# Patient Record
Sex: FEMALE | Race: WHITE | NOT HISPANIC OR LATINO | Employment: UNEMPLOYED | URBAN - METROPOLITAN AREA
[De-identification: names, ages, dates, MRNs, and addresses within clinical notes are randomized per-mention and may not be internally consistent; named-entity substitution may affect disease eponyms.]

---

## 2017-02-07 ENCOUNTER — RECORDS - HEALTHEAST (OUTPATIENT)
Dept: LAB | Facility: CLINIC | Age: 27
End: 2017-02-07

## 2017-02-07 LAB — HIV 1+2 AB+HIV1 P24 AG SERPL QL IA: NEGATIVE

## 2017-02-08 LAB
HCV AB SERPL QL IA: NEGATIVE
SYPHILIS RPR SCREEN - HISTORICAL: NORMAL

## 2017-06-26 ENCOUNTER — RECORDS - HEALTHEAST (OUTPATIENT)
Dept: LAB | Facility: CLINIC | Age: 27
End: 2017-06-26

## 2017-06-26 LAB — HIV 1+2 AB+HIV1 P24 AG SERPL QL IA: NEGATIVE

## 2017-06-27 LAB
HCV AB SERPL QL IA: NEGATIVE
SYPHILIS RPR SCREEN - HISTORICAL: NORMAL

## 2017-10-17 ENCOUNTER — RECORDS - HEALTHEAST (OUTPATIENT)
Dept: LAB | Facility: CLINIC | Age: 27
End: 2017-10-17

## 2017-10-17 LAB
HCV AB SERPL QL IA: NEGATIVE
HIV 1+2 AB+HIV1 P24 AG SERPL QL IA: NEGATIVE

## 2017-10-18 LAB
HEPATITIS B SURFACE ANTIBODY LHE- HISTORICAL: ABNORMAL
HSV1 IGG SERPL QL IA: POSITIVE
HSV2 IGG SERPL QL IA: NEGATIVE
SYPHILIS RPR SCREEN - HISTORICAL: NORMAL

## 2017-10-20 ENCOUNTER — RECORDS - HEALTHEAST (OUTPATIENT)
Dept: LAB | Facility: CLINIC | Age: 27
End: 2017-10-20

## 2017-10-21 LAB — HEPATITIS B SURFACE ANTIBODY LHE- HISTORICAL: ABNORMAL

## 2017-10-30 ENCOUNTER — RECORDS - HEALTHEAST (OUTPATIENT)
Dept: LAB | Facility: CLINIC | Age: 27
End: 2017-10-30

## 2017-11-01 LAB
HBV CORE AB SERPL QL IA: NEGATIVE
HBV SURFACE AG SERPL QL IA: NEGATIVE
HEPATITIS B SURFACE ANTIBODY LHE- HISTORICAL: ABNORMAL

## 2018-01-04 ENCOUNTER — RECORDS - HEALTHEAST (OUTPATIENT)
Dept: LAB | Facility: CLINIC | Age: 28
End: 2018-01-04

## 2018-01-04 LAB — HIV 1+2 AB+HIV1 P24 AG SERPL QL IA: NEGATIVE

## 2018-01-05 LAB
C TRACH DNA SPEC QL PROBE+SIG AMP: NEGATIVE
HCV AB SERPL QL IA: NEGATIVE
N GONORRHOEA DNA SPEC QL NAA+PROBE: NEGATIVE
SYPHILIS RPR SCREEN - HISTORICAL: NORMAL

## 2018-01-06 LAB — BACTERIA SPEC CULT: ABNORMAL

## 2018-02-27 ENCOUNTER — RECORDS - HEALTHEAST (OUTPATIENT)
Dept: LAB | Facility: CLINIC | Age: 28
End: 2018-02-27

## 2018-02-28 LAB
C TRACH DNA SPEC QL PROBE+SIG AMP: NEGATIVE
N GONORRHOEA DNA SPEC QL NAA+PROBE: NEGATIVE

## 2018-03-16 ENCOUNTER — RECORDS - HEALTHEAST (OUTPATIENT)
Dept: LAB | Facility: CLINIC | Age: 28
End: 2018-03-16

## 2018-03-16 ENCOUNTER — APPOINTMENT (OUTPATIENT)
Dept: ULTRASOUND IMAGING | Facility: CLINIC | Age: 28
End: 2018-03-16
Attending: PHYSICIAN ASSISTANT
Payer: COMMERCIAL

## 2018-03-16 ENCOUNTER — HOSPITAL ENCOUNTER (EMERGENCY)
Facility: CLINIC | Age: 28
Discharge: HOME | End: 2018-03-17
Attending: PHYSICIAN ASSISTANT | Admitting: PHYSICIAN ASSISTANT
Payer: COMMERCIAL

## 2018-03-16 DIAGNOSIS — Z20.2 SEXUALLY TRANSMITTED DISEASE EXPOSURE: ICD-10-CM

## 2018-03-16 DIAGNOSIS — B96.89 BACTERIAL VAGINOSIS: ICD-10-CM

## 2018-03-16 DIAGNOSIS — R10.2 PELVIC PAIN IN FEMALE: ICD-10-CM

## 2018-03-16 DIAGNOSIS — N76.0 BACTERIAL VAGINOSIS: ICD-10-CM

## 2018-03-16 LAB
ALBUMIN SERPL-MCNC: 3.8 G/DL (ref 3.4–5)
ALBUMIN UR-MCNC: NEGATIVE MG/DL
ALP SERPL-CCNC: 70 U/L (ref 40–150)
ALT SERPL W P-5'-P-CCNC: 44 U/L (ref 0–50)
AMORPH CRY #/AREA URNS HPF: ABNORMAL /HPF
ANION GAP SERPL CALCULATED.3IONS-SCNC: 7 MMOL/L (ref 3–14)
APPEARANCE UR: ABNORMAL
AST SERPL W P-5'-P-CCNC: 16 U/L (ref 0–45)
BASOPHILS # BLD AUTO: 0 10E9/L (ref 0–0.2)
BASOPHILS NFR BLD AUTO: 0.2 %
BILIRUB SERPL-MCNC: 0.6 MG/DL (ref 0.2–1.3)
BILIRUB UR QL STRIP: NEGATIVE
BUN SERPL-MCNC: 15 MG/DL (ref 7–30)
CALCIUM SERPL-MCNC: 8.8 MG/DL (ref 8.5–10.1)
CHLORIDE SERPL-SCNC: 106 MMOL/L (ref 94–109)
CO2 SERPL-SCNC: 25 MMOL/L (ref 20–32)
COLOR UR AUTO: YELLOW
CREAT SERPL-MCNC: 0.91 MG/DL (ref 0.52–1.04)
DIFFERENTIAL METHOD BLD: ABNORMAL
EOSINOPHIL # BLD AUTO: 0.3 10E9/L (ref 0–0.7)
EOSINOPHIL NFR BLD AUTO: 2.6 %
ERYTHROCYTE [DISTWIDTH] IN BLOOD BY AUTOMATED COUNT: 13 % (ref 10–15)
GFR SERPL CREATININE-BSD FRML MDRD: 74 ML/MIN/1.7M2
GLUCOSE SERPL-MCNC: 91 MG/DL (ref 70–99)
GLUCOSE UR STRIP-MCNC: NEGATIVE MG/DL
HCG UR QL: NEGATIVE
HCT VFR BLD AUTO: 39.7 % (ref 35–47)
HGB BLD-MCNC: 13.6 G/DL (ref 11.7–15.7)
HGB UR QL STRIP: NEGATIVE
IMM GRANULOCYTES # BLD: 0.1 10E9/L (ref 0–0.4)
IMM GRANULOCYTES NFR BLD: 0.5 %
KETONES UR STRIP-MCNC: NEGATIVE MG/DL
LEUKOCYTE ESTERASE UR QL STRIP: ABNORMAL
LYMPHOCYTES # BLD AUTO: 3.3 10E9/L (ref 0.8–5.3)
LYMPHOCYTES NFR BLD AUTO: 25.9 %
MCH RBC QN AUTO: 29 PG (ref 26.5–33)
MCHC RBC AUTO-ENTMCNC: 34.3 G/DL (ref 31.5–36.5)
MCV RBC AUTO: 85 FL (ref 78–100)
MONOCYTES # BLD AUTO: 0.8 10E9/L (ref 0–1.3)
MONOCYTES NFR BLD AUTO: 6.2 %
MUCOUS THREADS #/AREA URNS LPF: PRESENT /LPF
NEUTROPHILS # BLD AUTO: 8.2 10E9/L (ref 1.6–8.3)
NEUTROPHILS NFR BLD AUTO: 64.6 %
NITRATE UR QL: NEGATIVE
NRBC # BLD AUTO: 0 10*3/UL
NRBC BLD AUTO-RTO: 0 /100
PH UR STRIP: 7 PH (ref 5–7)
PLATELET # BLD AUTO: 210 10E9/L (ref 150–450)
POTASSIUM SERPL-SCNC: 4 MMOL/L (ref 3.4–5.3)
PROT SERPL-MCNC: 6.7 G/DL (ref 6.8–8.8)
RBC # BLD AUTO: 4.69 10E12/L (ref 3.8–5.2)
RBC #/AREA URNS AUTO: 6 /HPF (ref 0–2)
SODIUM SERPL-SCNC: 138 MMOL/L (ref 133–144)
SOURCE: ABNORMAL
SP GR UR STRIP: 1.01 (ref 1–1.03)
SPECIMEN SOURCE: ABNORMAL
SQUAMOUS #/AREA URNS AUTO: 3 /HPF (ref 0–1)
UROBILINOGEN UR STRIP-MCNC: NORMAL MG/DL (ref 0–2)
WBC # BLD AUTO: 12.7 10E9/L (ref 4–11)
WBC #/AREA URNS AUTO: 19 /HPF (ref 0–5)
WET PREP SPEC: ABNORMAL

## 2018-03-16 PROCEDURE — 93976 VASCULAR STUDY: CPT | Mod: XS

## 2018-03-16 PROCEDURE — 87591 N.GONORRHOEAE DNA AMP PROB: CPT | Performed by: PHYSICIAN ASSISTANT

## 2018-03-16 PROCEDURE — 99285 EMERGENCY DEPT VISIT HI MDM: CPT | Mod: 25

## 2018-03-16 PROCEDURE — 87491 CHLMYD TRACH DNA AMP PROBE: CPT | Performed by: PHYSICIAN ASSISTANT

## 2018-03-16 PROCEDURE — 25000128 H RX IP 250 OP 636: Performed by: PHYSICIAN ASSISTANT

## 2018-03-16 PROCEDURE — 87210 SMEAR WET MOUNT SALINE/INK: CPT | Performed by: PHYSICIAN ASSISTANT

## 2018-03-16 PROCEDURE — 81025 URINE PREGNANCY TEST: CPT | Performed by: EMERGENCY MEDICINE

## 2018-03-16 PROCEDURE — 80053 COMPREHEN METABOLIC PANEL: CPT | Performed by: PHYSICIAN ASSISTANT

## 2018-03-16 PROCEDURE — 85025 COMPLETE CBC W/AUTO DIFF WBC: CPT | Performed by: PHYSICIAN ASSISTANT

## 2018-03-16 PROCEDURE — 81001 URINALYSIS AUTO W/SCOPE: CPT | Performed by: EMERGENCY MEDICINE

## 2018-03-16 PROCEDURE — 96372 THER/PROPH/DIAG INJ SC/IM: CPT

## 2018-03-16 PROCEDURE — 25000132 ZZH RX MED GY IP 250 OP 250 PS 637: Performed by: PHYSICIAN ASSISTANT

## 2018-03-16 RX ORDER — AZITHROMYCIN 250 MG/1
1000 TABLET, FILM COATED ORAL ONCE
Status: COMPLETED | OUTPATIENT
Start: 2018-03-16 | End: 2018-03-16

## 2018-03-16 RX ORDER — CEFTRIAXONE SODIUM 250 MG
250 VIAL (EA) INJECTION EVERY 24 HOURS
Status: DISCONTINUED | OUTPATIENT
Start: 2018-03-16 | End: 2018-03-17 | Stop reason: HOSPADM

## 2018-03-16 RX ORDER — ARIPIPRAZOLE ORAL 1 MG/ML
5 SOLUTION ORAL DAILY
Status: ON HOLD | COMMUNITY
End: 2022-08-12

## 2018-03-16 RX ADMIN — AZITHROMYCIN 1000 MG: 250 TABLET, FILM COATED ORAL at 21:53

## 2018-03-16 RX ADMIN — CEFTRIAXONE SODIUM 250 MG: 250 INJECTION, POWDER, FOR SOLUTION INTRAMUSCULAR; INTRAVENOUS at 21:54

## 2018-03-16 ASSESSMENT — ENCOUNTER SYMPTOMS
NAUSEA: 0
VOMITING: 0
DIARRHEA: 0
DYSURIA: 1

## 2018-03-16 NOTE — ED AVS SNAPSHOT
Emergency Department    [address] [facility] [address]    Phone:  [phone]    Fax:  [phone]                                       Eden Castro   MRN: [id number]    Department:   Emergency Department   Date of Visit:  3/16/2018           After Visit Summary Signature Page     I have received my discharge instructions, and my questions have been answered. I have discussed any challenges I see with this plan with the nurse or doctor.    ..........................................................................................................................................  Patient/Patient Representative Signature      ..........................................................................................................................................  Patient Representative Print Name and Relationship to Patient    ..................................................               ................................................  Date                                            Time    ..........................................................................................................................................  Reviewed by Signature/Title    ...................................................              ..............................................  Date                                                            Time

## 2018-03-16 NOTE — ED AVS SNAPSHOT
Emergency Department    [address] [facility] [address]    Phone:  [phone]    Fax:  [phone]                                       Eden Castro   MRN: [id number]    Department:   Emergency Department   Date of Visit:  3/16/2018           Patient Information     Date Of Birth          1990        Your diagnoses for this visit were:     Sexually transmitted disease exposure     Pelvic pain in female     Bacterial vaginosis        You were seen by Jordana Correa PA-C.      Follow-up Information     Follow up with OBGYN In 3 days.    Why:  recheck        Follow up with  Emergency Department.    Specialty:  EMERGENCY MEDICINE    Why:  If symptoms worsen    Contact information:    [address] [facility] [address] [phone]        Follow up with OBGYN In 2 weeks.    Why:  test of cure        Discharge Instructions         It is important that you abstain from sex until your retested.  Please take the antibiotics as prescribed (take the additional metronidazole).  Please use condoms in the future sexual encounters to help prevent STI transmission.  I encourage you have your boyfriend be retested as well.    Pelvic Inflammatory Disease    Pelvic inflammatory disease (PID) is an infection of the female reproductive organs. These include the vagina, cervix, uterus, fallopian tubes, and ovaries.  PID is a common problem among women. It is most often caused by gonorrhea or chlamydia. These are bacterial infections that are spread through sex. For this reason, they are known as sexually transmitted diseases (STDs). PID can also be caused by other infections, though this is much less common.  When PID is found and treated early, it can often be cured. If not treated promptly, PID can lead to serious health problems. These include chronic pelvic pain and damage to the reproductive organs. PID can also lead to infertility. And it can increase the risk of tubal  pregnancy. Mild cases of PID can often be treated at home. Severe cases of PID may need to be treated in the hospital.  Home care    You will likely be prescribed a combination of antibiotics. Be sure to take the medicines exactly as directed.    To help relieve pain, you may take over-the-counter pain medicine. Pain medicine may also be prescribed, if needed.    Inform any partners you ve had sex with about your condition. They will need to be tested for infection and treated as well.    Avoid having sex until you and any partners have finished treatment and tests show that you are no longer infected.  Prevention    If you choose to have sex, make sure to practice safer sex. For instance, have sex with only one partner who only has sex with you. Ask your partner to be tested for STDs. Each time you have sex, use latex condoms. These help reduce the risk of STDs.    Avoid douching if advised to by your healthcare provider. Douching may increase the risk of PID.  Follow-up care  Follow up with your healthcare provider, or as advised.  When to seek medical advice  Call your healthcare provider right away if any of these occur:    Fever of 100.4 F (38 C) or higher, or as directed by your healthcare provider    Symptoms do not improve after 3 days of treatment    New or increasing pain in your lower belly or back    Abnormal vaginal bleeding    Abnormal vaginal discharge    Weakness, dizziness or fainting    Nausea or vomiting    Trouble with urination or pain and burning during urination    Rash or joint pain    Painful open sores around the vagina    Swollen or painful lymph nodes in the groin (these may be felt as lumps in the groin)  Resources  To learn more about PID and STDs, contact:    The CDC National STD Hotline, 193.110.5042, www.cdc.gov/std/  Date Last Reviewed: 6/11/2015 2000-2017 [employer]. [address] [place] [address], [place] [address]. All rights reserved. This information is not intended  as a substitute for professional medical care. Always follow your healthcare professional's instructions.        Bacterial Vaginosis    You have a vaginal infection called bacterial vaginosis (BV). Both good and bad bacteria are present in a healthy vagina. BV occurs when these bacteria get out of balance. The number of bad bacteria increase. And the number of good bacteria decrease.  BV may or may not cause symptoms. If symptoms do occur, they can include:    Thin, gray, milky-white, or sometimes green discharge    Unpleasant odor or  fishy  smell    Itching, burning, or pain in or around the vagina  It is not known what causes BV, but certain factors can make the problem more likely. This can include:    Douching    Having sex with a new partner    Having sex with more than one partner  BV will sometimes go away on its own. But treatment is usually recommended. This is because untreated BV can increase the risk of more serious health problems such as:    Pelvic inflammatory disease (PID)     delivery (giving birth to a baby early if you re pregnant)    HIV and certain other sexually transmitted diseases (STDs)    Infection after surgery on the reproductive organs  Home care  General care    BV is most often treated with medicines called antibiotics. These may be given as pills or as a vaginal cream. If antibiotics are prescribed, be sure to use them exactly as directed. Also, be sure to complete all of the medicine, even if your symptoms go away.    Avoid douching or having sex during treatment.    If you have sex with a female partner, ask your healthcare provider if she should also be treated.  Prevention    Limit or avoid douching.    Avoid having sex. If you do have sex, then take steps to lower your risk:    Use condoms when having sex.    Limit the number of partners you have sex with.  Follow-up care  Follow up with your healthcare provider, or as advised.  When to seek medical advice  Call your  healthcare provider right away if:    You have a fever of 100.4 F (38 C) or higher, or as directed by your provider.    Your symptoms worsen, or they don t go away within a few days of starting treatment.    You have new pain in the lower belly or pelvic region.    You have side effects that bother you or a reaction to the pills or cream you re prescribed.    You or any partners you have sex with have new symptoms, such as a rash, joint pain, or sores.  Date Last Reviewed: 7/30/2015 2000-2017 [employer]. [address] [place] [address]. All rights reserved. This information is not intended as a substitute for professional medical care. Always follow your healthcare professional's instructions.          24 Hour Appointment Hotline       To make an appointment at any [facility], call [phone] ([phone]). If you don't have a family doctor or clinic, we will help you find one. [place] clinics are conveniently located to serve the needs of you and your family.             Review of your medicines      START taking        Dose / Directions Last dose taken    doxycycline Monohydrate 100 MG Tabs   Dose:  100 mg   Quantity:  28 tablet        Take 100 mg by mouth 2 times daily for 14 days   Refills:  0          CONTINUE these medicines which may have CHANGED, or have new prescriptions. If we are uncertain of the size of tablets/capsules you have at home, strength may be listed as something that might have changed.        Dose / Directions Last dose taken    metroNIDAZOLE 500 MG tablet   Commonly known as:  FLAGYL   Dose:  500 mg   What changed:    - medication strength  - how much to take  - when to take this   Quantity:  14 tablet        Take 1 tablet (500 mg) by mouth 2 times daily for 7 days   Refills:  1          Our records show that you are taking the medicines listed below. If these are incorrect, please call your family doctor or clinic.        Dose / Directions Last dose  taken    ARIPiprazole 1 MG/ML Soln solution   Commonly known as:  ABILIFY   Dose:  5 mg        Take 5 mg by mouth daily   Refills:  0        GABAPENTIN PO        Refills:  0        PROPRANOLOL HCL PO        Refills:  0        TOPAMAX PO        Refills:  0        WELLBUTRIN PO        Refills:  0                Prescriptions were sent or printed at these locations (2 Prescriptions)                   Other Prescriptions                Printed at Department/Unit printer (2 of 2)         doxycycline Monohydrate 100 MG TABS               metroNIDAZOLE (FLAGYL) 500 MG tablet                Procedures and tests performed during your visit     CBC with platelets differential    Chlamydia trachomatis PCR    Comprehensive metabolic panel    HCG qualitative urine (UPT)    Neisseria gonorrhoeae PCR    UA reflex to Microscopic    US Pelvic Complete w Transvaginal & Abd/Pel Duplex Limited    Wet prep      Orders Needing Specimen Collection     None      Pending Results     Date and Time Order Name Status Description    3/16/2018 2128 US Pelvic Complete w Transvaginal & Abd/Pel Duplex Limited Preliminary     3/16/2018 2128 Chlamydia trachomatis PCR In process     3/16/2018 2128 Neisseria gonorrhoeae PCR In process             Pending Culture Results     Date and Time Order Name Status Description    3/16/2018 2128 Chlamydia trachomatis PCR In process     3/16/2018 2128 Neisseria gonorrhoeae PCR In process             Pending Results Instructions     If you had any lab results that were not finalized at the time of your Discharge, you can call the ED Lab Result RN at [phone]. You will be contacted by this team for any positive Lab results or changes in treatment. The nurses are available 7 days a week from 10A to 6:30P.  You can leave a message 24 hours per day and they will return your call.        Test Results From Your Hospital Stay        3/16/2018  9:40 PM      Component Results     Component Value Ref Range & Units Status     Color Urine Yellow  Final    Appearance Urine Slightly Cloudy  Final    Glucose Urine Negative NEG^Negative mg/dL Final    Bilirubin Urine Negative NEG^Negative Final    Ketones Urine Negative NEG^Negative mg/dL Final    Specific Gravity Urine 1.014 1.003 - 1.035 Final    Blood Urine Negative NEG^Negative Final    pH Urine 7.0 5.0 - 7.0 pH Final    Protein Albumin Urine Negative NEG^Negative mg/dL Final    Urobilinogen mg/dL Normal 0.0 - 2.0 mg/dL Final    Nitrite Urine Negative NEG^Negative Final    Leukocyte Esterase Urine Large (A) NEG^Negative Final    Source Midstream Urine  Final    RBC Urine 6 (H) 0 - 2 /HPF Final    WBC Urine 19 (H) 0 - 5 /HPF Final    Squamous Epithelial /HPF Urine 3 (H) 0 - 1 /HPF Final    Mucous Urine Present (A) NEG^Negative /LPF Final    Amorphous Crystals Few (A) NEG^Negative /HPF Final         3/16/2018  9:20 PM      Component Results     Component Value Ref Range & Units Status    HCG Qual Urine Negative NEG^Negative Final    This test is for screening purposes.  Results should be interpreted along with   the clinical picture.  Confirmation testing is available if warranted by   ordering TMR274, HCG Quantitative Pregnancy.           3/16/2018 10:48 PM         3/16/2018 10:48 PM         3/17/2018 12:08 AM      Narrative     US PELVIS COMPLETE W TRANSVAGINAL AND DOPPLER LIMITED  3/16/2018 11:58  PM     INDICATION: Pelvic pain, exposure to STI, possible PID.    COMPARISON: None.    FINDINGS: Transabdominal followed by endovaginal ultrasound to better  evaluate ovaries. Uterus measures 7.8 x 4.1 x 3.5 cm. Endometrial  stripe measures 0.5 cm in thickness. Trace endometrial fluid. Both  ovaries are visualized and within normal limits containing small  follicles. No suspicious ovarian or adnexal masses. Doppler color and  waveform analysis demonstrates blood flow to both ovaries. Small  amount of free fluid.        Impression     IMPRESSION:  1. Small amount of free pelvic fluid, within  normal physiologic  limits.  2. Otherwise negative.         3/16/2018  9:50 PM      Component Results     Component Value Ref Range & Units Status    WBC 12.7 (H) 4.0 - 11.0 10e9/L Final    RBC Count 4.69 3.8 - 5.2 10e12/L Final    Hemoglobin 13.6 11.7 - 15.7 g/dL Final    Hematocrit 39.7 35.0 - 47.0 % Final    MCV 85 78 - 100 fl Final    MCH 29.0 26.5 - 33.0 pg Final    MCHC 34.3 31.5 - 36.5 g/dL Final    RDW 13.0 10.0 - 15.0 % Final    Platelet Count 210 150 - 450 10e9/L Final    Diff Method Automated Method  Final    % Neutrophils 64.6 % Final    % Lymphocytes 25.9 % Final    % Monocytes 6.2 % Final    % Eosinophils 2.6 % Final    % Basophils 0.2 % Final    % Immature Granulocytes 0.5 % Final    Nucleated RBCs 0 0 /100 Final    Absolute Neutrophil 8.2 1.6 - 8.3 10e9/L Final    Absolute Lymphocytes 3.3 0.8 - 5.3 10e9/L Final    Absolute Monocytes 0.8 0.0 - 1.3 10e9/L Final    Absolute Eosinophils 0.3 0.0 - 0.7 10e9/L Final    Absolute Basophils 0.0 0.0 - 0.2 10e9/L Final    Abs Immature Granulocytes 0.1 0 - 0.4 10e9/L Final    Absolute Nucleated RBC 0.0  Final         3/16/2018 10:08 PM      Component Results     Component Value Ref Range & Units Status    Sodium 138 133 - 144 mmol/L Final    Potassium 4.0 3.4 - 5.3 mmol/L Final    Chloride 106 94 - 109 mmol/L Final    Carbon Dioxide 25 20 - 32 mmol/L Final    Anion Gap 7 3 - 14 mmol/L Final    Glucose 91 70 - 99 mg/dL Final    Urea Nitrogen 15 7 - 30 mg/dL Final    Creatinine 0.91 0.52 - 1.04 mg/dL Final    GFR Estimate 74 >60 mL/min/1.7m2 Final    Non  GFR Calc    GFR Estimate If Black 89 >60 mL/min/1.7m2 Final    African American GFR Calc    Calcium 8.8 8.5 - 10.1 mg/dL Final    Bilirubin Total 0.6 0.2 - 1.3 mg/dL Final    Albumin 3.8 3.4 - 5.0 g/dL Final    Protein Total 6.7 (L) 6.8 - 8.8 g/dL Final    Alkaline Phosphatase 70 40 - 150 U/L Final    ALT 44 0 - 50 U/L Final    AST 16 0 - 45 U/L Final         3/16/2018 11:05 PM      Component  Results     Component    Specimen Description    Vagina    Wet Prep    No Trichomonas seen    Wet Prep    No yeast seen    Wet Prep (Abnormal)    Clue cells seen    Wet Prep    PMNs seen  Many                  Clinical Quality Measure: Blood Pressure Screening     Your blood pressure was checked while you were in the emergency department today. The last reading we obtained was  BP: 138/80 . Please read the guidelines below about what these numbers mean and what you should do about them.  If your systolic blood pressure (the top number) is less than 120 and your diastolic blood pressure (the bottom number) is less than 80, then your blood pressure is normal. There is nothing more that you need to do about it.  If your systolic blood pressure (the top number) is 120-139 or your diastolic blood pressure (the bottom number) is 80-89, your blood pressure may be higher than it should be. You should have your blood pressure rechecked within a year by a primary care provider.  If your systolic blood pressure (the top number) is 140 or greater or your diastolic blood pressure (the bottom number) is 90 or greater, you may have high blood pressure. High blood pressure is treatable, but if left untreated over time it can put you at risk for heart attack, stroke, or kidney failure. You should have your blood pressure rechecked by a primary care provider within the next 4 weeks.  If your provider in the emergency department today gave you specific instructions to follow-up with your doctor or provider even sooner than that, you should follow that instruction and not wait for up to 4 weeks for your follow-up visit.        Thank you for choosing [facility]       Thank you for choosing [facility] for your care. Our goal is always to provide you with excellent care. Hearing back from our patients is one way we can continue to improve our services. Please take a few minutes to complete the written survey that you may receive in the mail  "after you visit with us. Thank you!        [employer] Information     [employer] lets you send messages to your doctor, view your test results, renew your prescriptions, schedule appointments and more. To sign up, go to [email or web address] . Click on \"Log in\" on the left side of the screen, which will take you to the Welcome page. Then click on \"Sign up Now\" on the right side of the page.     You will be asked to enter the access code listed below, as well as some personal information. Please follow the directions to create your username and password.     Your access code is: [email or web address]  Expires: 6/15/2018 12:26 AM     Your access code will  in 90 days. If you need help or a new code, please call your [facility] clinic or [phone].        Care EveryWhere ID     This is your Care EveryWhere ID. This could be used by other organizations to access your [facility] medical records  [id number]        Equal Access to Services     [facility] : Hadii aad ku hadasho Soanaali, waaxda luqadaha, qaybta kaalmada ademyriamyaana maria, luanne waggoner . So [facility] [phone].    ATENCIÓN: Si habla español, tiene a perez disposición servicios gratuitos de asistencia lingüística. Llame al [phone].    We comply with applicable federal civil rights laws and [place] laws. We do not discriminate on the basis of race, color, national origin, age, disability, sex, sexual orientation, or gender identity.            After Visit Summary       This is your record. Keep this with you and show to your community pharmacist(s) and doctor(s) at your next visit.                  "

## 2018-03-17 VITALS
TEMPERATURE: 98 F | DIASTOLIC BLOOD PRESSURE: 80 MMHG | HEIGHT: 64 IN | HEART RATE: 70 BPM | OXYGEN SATURATION: 98 % | BODY MASS INDEX: 28.17 KG/M2 | RESPIRATION RATE: 18 BRPM | WEIGHT: 165 LBS | SYSTOLIC BLOOD PRESSURE: 138 MMHG

## 2018-03-17 RX ORDER — DOXYCYCLINE 100 MG/1
100 TABLET ORAL 2 TIMES DAILY
Qty: 28 TABLET | Refills: 0 | Status: SHIPPED | OUTPATIENT
Start: 2018-03-17 | End: 2018-03-31

## 2018-03-17 RX ORDER — METRONIDAZOLE 500 MG/1
500 TABLET ORAL 2 TIMES DAILY
Qty: 14 TABLET | Refills: 1 | Status: SHIPPED | OUTPATIENT
Start: 2018-03-17 | End: 2018-03-24

## 2018-03-17 NOTE — ED PROVIDER NOTES
"  History     Chief Complaint:  Rule out Urinary Tract Infection    HPI   Eden Castro is a 27 year old female who presents alone to the emergency department for evaluation of vaginal discharge and dysuria.  Of note, patient reports her significant other was tested positive for an STD 3-4 weeks ago, but her boyfriend cannot recall which when he tested positive for.  They have had unprotected sexual intercourse several times since. Two days ago, patient began to experience some urinary symptoms, including dysuria, pelvic cramping, vaginal discomfort and an \"achy vaginal feeling\".  She also notes a fishy smell but states the vaginal discharge is different than when she has had BV in the past.  She does not she is nearly due for her period and does not know if the pelvic cramping is from this. She is concerned that she may have contracted an STI her partner had, prompting her evaluation.  She did call her primary care doctor 2 days ago and was given a prescription for Macrobid, which she has been taking without improvement in symptoms.  She states it does not feel similar to her previous UTIs.  She states however it does feel similar to when she was previously diagnosed with gonorrhea.  She denies any other sexual contacts, but states she has had sexual intercourse with her boyfriend several times. The patient does note the possibility of pregnancy, but denies any fever, vomiting, nausea or diarrhea, or any other acute symptoms.    Allergies:  No Known Drug Allergies     Medications:    Abilify  Wellbutrin  Propanolol  Gabapentin  Topamax    Past Medical History:    The patient denies any relevant past medical history.    Past Surgical History:    ENT surgery    Family History:    The patient denies any relevant family medical history.     Social History:  The patient was accompanied to the ED alone.  Smoking Status: Yes  Smokeless Tobacco: No  Alcohol Use: No   Marital Status:       Review of Systems " "  Gastrointestinal: Negative for diarrhea, nausea and vomiting.   Genitourinary: Positive for dysuria, pelvic pain (cramping) and vaginal pain (\"achy\" and discomfort). Negative for vaginal discharge.   All other systems reviewed and are negative.    Physical Exam   Vitals:  Patient Vitals for the past 24 hrs:   BP Temp Temp src Pulse Heart Rate Resp SpO2 Height Weight   03/16/18 2047 144/74 98  F (36.7  C) Temporal 78 78 18 97 % 1.626 m (5' 4\") 74.8 kg (165 lb)      Physical Exam  General: Resting comfortably.  Alert and oriented.   Head:  The scalp, face, and head appear normal   ENT:  Moist mucous membranes,    Uvula is midline.    No lesions.  CV:  Regular rate and rhythm     Normal S1/S2    No pathological murmur detected   Resp:  Lungs are clear to auscultation    Non-labored    No rales or wheezing   :   Normal external female genitalia. Moderate amount of yellow discharge noted in vaginal vault with a small amount coming from the cervix. Positive CMT. Mildly tender in the left adnexal area.   Skin:  No rash or acute skin lesions noted     Emergency Department Course     Imaging:  Radiology findings were communicated with the patient who voiced understanding of the findings.  US Pelvic Complete w Transvaginal & Abd/Pel Duplex:     US Pelvic Complete w Transvaginal & Abd/Pel Duplex Limited   Final Result   IMPRESSION:   1. Small amount of free pelvic fluid, within normal physiologic   limits.   2. Otherwise negative.      MARILYN VELIZ MD        Reading per radiology.     Laboratory:  Laboratory findings were communicated with the patient who voiced understanding of the findings.  UA reflex to Microscopic: Leukocyte Esterase Urine Large (A), WBC/HPF 19 (H), RBC/HPF 6 (H), Squamous Epithelial/HPF Urine 3 (H), Mucous Urine Present (A), Amorphous Crystals Few (A) o/w WNL   HCQ Qualitative Urine: Negative    CBC: WBC 12.7 (H) o/w WNL (HGB 13.6, )   CMP: Protein Total 6.7 (L) o/w  WNL (Creatinine " 0.91)    Chlamydia trachomatis PCR: Pending  Neisseria gonorrhea PCR: Pending  Wet Prep: Clue Cells seen (A)    Interventions:  2153 Zithromax 1000 mg PO  2154 Rocephin 250 mg IM     Emergency Department Course:  Nursing notes and vitals reviewed.  The patient provided a urine sample here in the emergency department. This was sent for laboratory testing, findings above.   IV was inserted and blood was drawn for laboratory testing, results above.   The patient was sent for a US Pelvic Complete w Transvaginal & Abd/Pel Duplex while in the emergency department, results above.      9:13 PM: I performed an exam of the patient as documented above. History was obtained from patient.  10:34 PM: I performed a pelvic exam.   12 PM: Discussed results with patient. Discussed plan of care and patient will be discharged home.    I discussed the treatment plan with the patient. They expressed understanding of this plan and consented to discharge. They will be discharged home with instructions for care and follow up. In addition, the patient will return to the emergency department if their symptoms persist, worsen, if new symptoms arise or if there is any concern.  All questions were answered.     I personally reviewed the laboratory results with the Patient and answered all related questions prior to discharge.    Impression & Plan      Medical Decision Making:  Eden Castro is a 27 year old female presents for evaluation for vaginal discharge and dysuria.  Patient presents vitally stable and afebrile. A large differential was considered including but not limited to STI, UTI, PID, cervicitis, pyelonephritis, ovarian torsion, cyst, among others.  Overall, blood work was unremarkable.  Urine hCG is negative.  UA demonstrates likely contaminated specimen, but could represent early UTI.  Urine culture was sent. Wet prep demonstrates BV.  Gonorrhea and chlamydia testing pending.  Pelvic ultrasound is unremarkable.  There is no  evidence of tubo-ovarian abscess, ovarian torsion, or any other abnormality.  However, given the patient's exposure to STI, I treated this patient with ceftriaxone and azithromycin as noted above.  PID was considered likely in this case given the patient does have cervical motion tenderness and is complaining of pelvic pain.  Therefore, I will start this patient on doxycycline and extend her Macrobid prescription for 7 additional days.  She was asked to follow-up with her OB/GYN in 3-4 days for recheck.  She was also asked to follow-up in 2 weeks for test of cure.  She was counseled on the importance of condom use and safe sexual practices.  She was counseled on the importance of abstinence during this time of treatment to ensure she does not spread the presumed infection.  She was instructed that she will receive a call if the gonorrhea/chlamydia testing does come back positive.  She understands all these instructions and agrees to comply.  She is asked to return immediately if she develops worsening pelvic pain, fever, vomiting, or any other concerning symptoms.  All questions were answered prior to discharge.  Patient understands and agrees to this plan.    Diagnosis:    ICD-10-CM    1. Sexually transmitted disease exposure Z20.2    2. Pelvic pain in female R10.2    3. Bacterial vaginosis N76.0     B96.89         Disposition:   Discharged.    Discharge Medications:  Doxycycline  Macrobid    Scribe Disclosure:  I, Katiuska Marin, am serving as a scribe at 9:13 PM on 3/16/2018 to document services personally performed by Jordana Correa PA*, based on my observations and the provider's statements to me.  3/16/2018    EMERGENCY DEPARTMENT       Jordana Correa PA-C  03/17/18 0050

## 2018-03-17 NOTE — DISCHARGE INSTRUCTIONS
It is important that you abstain from sex until your retested.  Please take the antibiotics as prescribed (take the additional metronidazole).  Please use condoms in the future sexual encounters to help prevent STI transmission.  I encourage you have your boyfriend be retested as well.    Pelvic Inflammatory Disease    Pelvic inflammatory disease (PID) is an infection of the female reproductive organs. These include the vagina, cervix, uterus, fallopian tubes, and ovaries.  PID is a common problem among women. It is most often caused by gonorrhea or chlamydia. These are bacterial infections that are spread through sex. For this reason, they are known as sexually transmitted diseases (STDs). PID can also be caused by other infections, though this is much less common.  When PID is found and treated early, it can often be cured. If not treated promptly, PID can lead to serious health problems. These include chronic pelvic pain and damage to the reproductive organs. PID can also lead to infertility. And it can increase the risk of tubal pregnancy. Mild cases of PID can often be treated at home. Severe cases of PID may need to be treated in the hospital.  Home care    You will likely be prescribed a combination of antibiotics. Be sure to take the medicines exactly as directed.    To help relieve pain, you may take over-the-counter pain medicine. Pain medicine may also be prescribed, if needed.    Inform any partners you ve had sex with about your condition. They will need to be tested for infection and treated as well.    Avoid having sex until you and any partners have finished treatment and tests show that you are no longer infected.  Prevention    If you choose to have sex, make sure to practice safer sex. For instance, have sex with only one partner who only has sex with you. Ask your partner to be tested for STDs. Each time you have sex, use latex condoms. These help reduce the risk of STDs.    Avoid douching if  advised to by your healthcare provider. Douching may increase the risk of PID.  Follow-up care  Follow up with your healthcare provider, or as advised.  When to seek medical advice  Call your healthcare provider right away if any of these occur:    Fever of 100.4 F (38 C) or higher, or as directed by your healthcare provider    Symptoms do not improve after 3 days of treatment    New or increasing pain in your lower belly or back    Abnormal vaginal bleeding    Abnormal vaginal discharge    Weakness, dizziness or fainting    Nausea or vomiting    Trouble with urination or pain and burning during urination    Rash or joint pain    Painful open sores around the vagina    Swollen or painful lymph nodes in the groin (these may be felt as lumps in the groin)  Resources  To learn more about PID and STDs, contact:    [facility] National STD Hotline, 703.475.3239, www.cdc.gov/std/  Date Last Reviewed: 6/11/2015 2000-2017 [employer]. [address] [place] [address]. All rights reserved. This information is not intended as a substitute for professional medical care. Always follow your healthcare professional's instructions.        Bacterial Vaginosis    You have a vaginal infection called bacterial vaginosis (BV). Both good and bad bacteria are present in a healthy vagina. BV occurs when these bacteria get out of balance. The number of bad bacteria increase. And the number of good bacteria decrease.  BV may or may not cause symptoms. If symptoms do occur, they can include:    Thin, gray, milky-white, or sometimes green discharge    Unpleasant odor or  fishy  smell    Itching, burning, or pain in or around the vagina  It is not known what causes BV, but certain factors can make the problem more likely. This can include:    Douching    Having sex with a new partner    Having sex with more than one partner  BV will sometimes go away on its own. But treatment is usually recommended. This is because  untreated BV can increase the risk of more serious health problems such as:    Pelvic inflammatory disease (PID)     delivery (giving birth to a baby early if you re pregnant)    HIV and certain other sexually transmitted diseases (STDs)    Infection after surgery on the reproductive organs  Home care  General care    BV is most often treated with medicines called antibiotics. These may be given as pills or as a vaginal cream. If antibiotics are prescribed, be sure to use them exactly as directed. Also, be sure to complete all of the medicine, even if your symptoms go away.    Avoid douching or having sex during treatment.    If you have sex with a female partner, ask your healthcare provider if she should also be treated.  Prevention    Limit or avoid douching.    Avoid having sex. If you do have sex, then take steps to lower your risk:    Use condoms when having sex.    Limit the number of partners you have sex with.  Follow-up care  Follow up with your healthcare provider, or as advised.  When to seek medical advice  Call your healthcare provider right away if:    You have a fever of 100.4 F (38 C) or higher, or as directed by your provider.    Your symptoms worsen, or they don t go away within a few days of starting treatment.    You have new pain in the lower belly or pelvic region.    You have side effects that bother you or a reaction to the pills or cream you re prescribed.    You or any partners you have sex with have new symptoms, such as a rash, joint pain, or sores.  Date Last Reviewed: 2015-2017 [employer]. [address] [place] [address], [place] [address]. All rights reserved. This information is not intended as a substitute for professional medical care. Always follow your healthcare professional's instructions.

## 2018-03-18 LAB
C TRACH DNA SPEC QL NAA+PROBE: NEGATIVE
N GONORRHOEA DNA SPEC QL NAA+PROBE: NEGATIVE
SPECIMEN SOURCE: NORMAL
SPECIMEN SOURCE: NORMAL

## 2018-03-19 LAB
QTF INTERPRETATION: NORMAL
QTF MITOGEN - NIL: >10 IU/ML
QTF NIL: 0.03 IU/ML
QTF RESULT: NEGATIVE
QTF TB ANTIGEN - NIL: -0.01 IU/ML

## 2018-05-09 ENCOUNTER — RECORDS - HEALTHEAST (OUTPATIENT)
Dept: LAB | Facility: CLINIC | Age: 28
End: 2018-05-09

## 2018-05-09 LAB — HIV 1+2 AB+HIV1 P24 AG SERPL QL IA: NEGATIVE

## 2018-05-10 LAB
HBV SURFACE AG SERPL QL IA: NEGATIVE
HCV AB SERPL QL IA: NEGATIVE

## 2018-05-25 ENCOUNTER — RECORDS - HEALTHEAST (OUTPATIENT)
Dept: LAB | Facility: CLINIC | Age: 28
End: 2018-05-25

## 2018-05-27 LAB — BACTERIA SPEC CULT: ABNORMAL

## 2018-05-29 LAB — C TRACH DNA SPEC QL PROBE+SIG AMP: NEGATIVE

## 2018-07-17 ENCOUNTER — RECORDS - HEALTHEAST (OUTPATIENT)
Dept: LAB | Facility: CLINIC | Age: 28
End: 2018-07-17

## 2018-07-17 LAB — HIV 1+2 AB+HIV1 P24 AG SERPL QL IA: NEGATIVE

## 2018-07-18 LAB
HBV SURFACE AG SERPL QL IA: NEGATIVE
HCV AB SERPL QL IA: NEGATIVE

## 2018-07-31 ENCOUNTER — RECORDS - HEALTHEAST (OUTPATIENT)
Dept: LAB | Facility: CLINIC | Age: 28
End: 2018-07-31

## 2018-08-03 LAB
BACTERIA SPEC CULT: ABNORMAL
BACTERIA SPEC CULT: ABNORMAL

## 2018-08-23 ENCOUNTER — RECORDS - HEALTHEAST (OUTPATIENT)
Dept: LAB | Facility: CLINIC | Age: 28
End: 2018-08-23

## 2018-08-27 LAB
C TRACH DNA SPEC QL PROBE+SIG AMP: NEGATIVE
N GONORRHOEA DNA SPEC QL NAA+PROBE: NEGATIVE

## 2018-09-09 ENCOUNTER — RECORDS - HEALTHEAST (OUTPATIENT)
Dept: ADMINISTRATIVE | Facility: OTHER | Age: 28
End: 2018-09-09

## 2018-10-18 ENCOUNTER — RECORDS - HEALTHEAST (OUTPATIENT)
Dept: LAB | Facility: CLINIC | Age: 28
End: 2018-10-18

## 2018-10-18 LAB — HIV 1+2 AB+HIV1 P24 AG SERPL QL IA: NEGATIVE

## 2018-10-19 LAB
C TRACH DNA SPEC QL PROBE+SIG AMP: NEGATIVE
HBV SURFACE AG SERPL QL IA: NEGATIVE
HCV AB SERPL QL IA: NEGATIVE
HSV1 IGG SERPL QL IA: POSITIVE
HSV2 IGG SERPL QL IA: NEGATIVE
N GONORRHOEA DNA SPEC QL NAA+PROBE: NEGATIVE
T PALLIDUM AB SER QL: NEGATIVE

## 2019-01-18 ENCOUNTER — RECORDS - HEALTHEAST (OUTPATIENT)
Dept: LAB | Facility: CLINIC | Age: 29
End: 2019-01-18

## 2019-01-18 LAB
CHOLEST SERPL-MCNC: 192 MG/DL
FASTING STATUS PATIENT QL REPORTED: ABNORMAL
HDLC SERPL-MCNC: 66 MG/DL
HIV 1+2 AB+HIV1 P24 AG SERPL QL IA: NEGATIVE
LDLC SERPL CALC-MCNC: 88 MG/DL
TRIGL SERPL-MCNC: 189 MG/DL

## 2019-01-19 LAB — T PALLIDUM AB SER QL: NEGATIVE

## 2019-01-21 LAB
C TRACH DNA SPEC QL PROBE+SIG AMP: NEGATIVE
HCV AB SERPL QL IA: NEGATIVE
HEPATITIS B SURFACE ANTIBODY LHE- HISTORICAL: NEGATIVE
HPV SOURCE: NORMAL
HSV1 IGG SERPL QL IA: POSITIVE
HSV2 IGG SERPL QL IA: NEGATIVE
HUMAN PAPILLOMA VIRUS 16 DNA: NEGATIVE
HUMAN PAPILLOMA VIRUS 18 DNA: NEGATIVE
HUMAN PAPILLOMA VIRUS FINAL DIAGNOSIS: NORMAL
HUMAN PAPILLOMA VIRUS OTHER HR: NEGATIVE
N GONORRHOEA DNA SPEC QL NAA+PROBE: NEGATIVE
SPECIMEN DESCRIPTION: NORMAL

## 2019-01-28 LAB
BKR LAB AP ABNORMAL BLEEDING: NO
BKR LAB AP BIRTH CONTROL/HORMONES: NORMAL
BKR LAB AP CERVICAL APPEARANCE: NORMAL
BKR LAB AP GYN ADEQUACY: NORMAL
BKR LAB AP GYN INTERPRETATION: NORMAL
BKR LAB AP HPV REFLEX: NORMAL
BKR LAB AP LMP: NORMAL
BKR LAB AP PATIENT STATUS: NORMAL
BKR LAB AP PREVIOUS ABNORMAL: NORMAL
BKR LAB AP PREVIOUS NORMAL: NORMAL
HIGH RISK?: YES
PATH REPORT.COMMENTS IMP SPEC: NORMAL
RESULT FLAG (HE HISTORICAL CONVERSION): NORMAL

## 2019-01-29 ENCOUNTER — RECORDS - HEALTHEAST (OUTPATIENT)
Dept: LAB | Facility: CLINIC | Age: 29
End: 2019-01-29

## 2019-01-29 LAB — HCG SERPL-ACNC: <2 MLU/ML (ref 0–4)

## 2019-01-30 LAB
HBV CORE AB SERPL QL IA: NEGATIVE
HBV CORE IGM SERPL QL IA: NEGATIVE
HBV SURFACE AB SERPL IA-ACNC: ABNORMAL M[IU]/ML
HBV SURFACE AG SERPL QL IA: NEGATIVE

## 2019-03-01 ENCOUNTER — RECORDS - HEALTHEAST (OUTPATIENT)
Dept: LAB | Facility: CLINIC | Age: 29
End: 2019-03-01

## 2019-03-01 LAB — HIV 1+2 AB+HIV1 P24 AG SERPL QL IA: NEGATIVE

## 2019-03-02 LAB — T PALLIDUM AB SER QL: NEGATIVE

## 2019-03-04 LAB
C TRACH DNA SPEC QL PROBE+SIG AMP: NEGATIVE
N GONORRHOEA DNA SPEC QL NAA+PROBE: NEGATIVE

## 2019-03-29 ENCOUNTER — RECORDS - HEALTHEAST (OUTPATIENT)
Dept: LAB | Facility: CLINIC | Age: 29
End: 2019-03-29

## 2019-04-02 LAB — BACTERIA SPEC CULT: ABNORMAL

## 2019-04-24 ENCOUNTER — PATIENT OUTREACH (OUTPATIENT)
Dept: CARE COORDINATION | Facility: CLINIC | Age: 29
End: 2019-04-24

## 2019-04-24 DIAGNOSIS — F19.20 CHEMICAL DEPENDENCY (H): Primary | ICD-10-CM

## 2019-04-24 ASSESSMENT — ACTIVITIES OF DAILY LIVING (ADL): DEPENDENT_IADLS:: INDEPENDENT

## 2019-04-24 NOTE — PROGRESS NOTES
Contact  [facility]/Voicemail    Referral Source: Care Team  Clinical Data:  Outreach  Outreach attempted x 2.  Left message on voicemail with call back information and requested return call if further help is needed. Left [facility] in [place] phone number [phone] on voice mail.   Plan:  will do no further outreaches at this time. Patient is not in shared savings.  Informed CC.       Contact   Chart Review       Assessment: Talked to patient and she is finding herself stuck and not able to set up Rule 25 assessment. She has a boyfriend who also abuses drugs and they have not agreed to how to proceed.  He doesn't want to get clean.   She is ambivalent about going into treatment again. She has done treatment twice and she returns to using.  She has support from family and sober friends and family.  She does not attend AA or do anything to move to sobriety.  She has friends who use and sober friends and finds that her sober friends do not understand addiction.      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  She thinks she may actually have borderline personality disorder.  She has a psychiatrist from [facility], but no therapist at this time. She has worked with several counselors in the past and has not found them helpful. She is willing to try again and to find someone who has experience with dual diagnosis of CD and MH.  She is open to seeing a male or female provider.     JAMES discussed options with CC and she recommends [facility], [facility] or [facility] as they have support and treatment.   Reviewed options and [facility] and [facility] do not take MA.  Called patient and left a message asking for a call back to provide referral to [facility].     Plan/Recommendations: SW to talk to patient about referral to [facility]. If no call back, call in 3-5 business days.     Kelsea French,   [facility]  Network  [phone]         Contact  [facility]/Voicemail    Referral Source: Care Team  Clinical Data:  Outreach  Outreach attempted x 1.  Reached patient but is not able to talk.  Asked SW to call in one hour.  Called later and still busy. Asked for a call tomorrow.   Plan:  will try to reach patient nathan 1-2 business days.  Kelsea French,   [facility]  [phone]       Contact  OUTREACH    Referral Information:  Referral Source: Care Team- Care Coordinator Edwina.  Call to Edwina to discuss referral. She has left two voice mail messages with no return call.  Edwina asked SW to do outreach and assist with inpatient SUB options if patient is willing. 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      Primary Diagnosis: CD    Clinical Concerns:  Current Medical Concerns:   Talked to patient. Introduced self and patient agreed to talk.  She knows that PCP has recommended treatment and needs to get Rule 25 assessment.     Current Behavioral Concerns: None identified.     Education Provided to patient: Reviewed role of SW.     Medication Management:  No concerns identified.      Functional Status:  Dependent ADLs:: Independent, Ambulation-no assistive device  Dependent IADLs:: Independent  Bed or wheelchair confined:: No  Mobility Status: Independent  Fallen 2 or more times in the past year?: No  Any fall with injury in the past year?: No    Living Situation:  Current living arrangement:: I live alone  Type of residence:: Apartment    Diet/Exercise/Sleep:  Diet:: Regular  Inadequate nutrition (GOAL):: No  Food Insecurity: No  Tube Feeding: No  Inadequate activity/exercise (GOAL):: No  Significant changes in sleep pattern (GOAL): No    Transportation:  Transportation concerns (GOAL):: No      Psychosocial:  Mental health DX:: No  Mental health management concern (GOAL):: No     Financial/Insurance:   Financial/Insurance concerns  (GOAL):: No  BCBS [place]    Referrals Placed: CD     Patient/Caregiver understanding: Patient would like to complete Rule 25 assessment and identified [facility] in [place] as a place to get it. She has phone number and will call to arrange.  She does not see any barriers to doing this.  When asked why she would like to complete treatment, she identified reestablishing relationship with her three kids who currently live with their fathers.  She has time to complete the assessment.  Patient identified that she will call to schedule and agreed to have SW call in one week to assess progress.     Plan: Patient to call SW if she has any troubles setting up Rule 25 assessment. SW to call in one week.     Kelsea French,   [facility]  [email or web address]  [phone]

## 2019-05-10 ASSESSMENT — ACTIVITIES OF DAILY LIVING (ADL): DEPENDENT_IADLS:: INDEPENDENT

## 2019-07-18 ENCOUNTER — COMMUNICATION - HEALTHEAST (OUTPATIENT)
Dept: SCHEDULING | Facility: CLINIC | Age: 29
End: 2019-07-18

## 2019-08-11 ENCOUNTER — RECORDS - HEALTHEAST (OUTPATIENT)
Dept: ADMINISTRATIVE | Facility: OTHER | Age: 29
End: 2019-08-11

## 2019-11-02 ENCOUNTER — HOSPITAL ENCOUNTER (OUTPATIENT)
Facility: CLINIC | Age: 29
End: 2019-11-02
Admitting: OBSTETRICS & GYNECOLOGY
Payer: COMMERCIAL

## 2019-11-02 ENCOUNTER — HOSPITAL ENCOUNTER (OUTPATIENT)
Facility: CLINIC | Age: 29
Discharge: PSYCHIATRIC OR SUBSTANCE USE FACILITY | End: 2019-11-02
Attending: OBSTETRICS & GYNECOLOGY | Admitting: OBSTETRICS & GYNECOLOGY
Payer: COMMERCIAL

## 2019-11-02 VITALS
DIASTOLIC BLOOD PRESSURE: 61 MMHG | TEMPERATURE: 98.8 F | SYSTOLIC BLOOD PRESSURE: 129 MMHG | RESPIRATION RATE: 16 BRPM | BODY MASS INDEX: 28.32 KG/M2 | HEART RATE: 84 BPM | HEIGHT: 64 IN

## 2019-11-02 LAB
ALBUMIN UR-MCNC: 10 MG/DL
AMPHETAMINES UR QL SCN: NEGATIVE
APPEARANCE UR: CLEAR
BILIRUB UR QL STRIP: NEGATIVE
CANNABINOIDS UR QL: NEGATIVE
CAOX CRY #/AREA URNS HPF: ABNORMAL /HPF
COCAINE UR QL: NEGATIVE
COLOR UR AUTO: YELLOW
FIBRONECTIN FETAL VAG QL: NEGATIVE
GLUCOSE UR STRIP-MCNC: NEGATIVE MG/DL
HGB UR QL STRIP: NEGATIVE
KETONES UR STRIP-MCNC: NEGATIVE MG/DL
LEUKOCYTE ESTERASE UR QL STRIP: NEGATIVE
MUCOUS THREADS #/AREA URNS LPF: PRESENT /LPF
NITRATE UR QL: NEGATIVE
OPIATES UR QL SCN: NEGATIVE
PCP UR QL SCN: NEGATIVE
PH UR STRIP: 5.5 PH (ref 5–7)
RBC #/AREA URNS AUTO: <1 /HPF (ref 0–2)
RUPTURE OF FETAL MEMBRANES BY ROM PLUS: NEGATIVE
SOURCE: ABNORMAL
SP GR UR STRIP: 1.03 (ref 1–1.03)
SQUAMOUS #/AREA URNS AUTO: 1 /HPF (ref 0–1)
UROBILINOGEN UR STRIP-MCNC: NORMAL MG/DL (ref 0–2)
WBC #/AREA URNS AUTO: 1 /HPF (ref 0–5)

## 2019-11-02 PROCEDURE — 82731 ASSAY OF FETAL FIBRONECTIN: CPT | Performed by: OBSTETRICS & GYNECOLOGY

## 2019-11-02 PROCEDURE — 81001 URINALYSIS AUTO W/SCOPE: CPT | Performed by: OBSTETRICS & GYNECOLOGY

## 2019-11-02 PROCEDURE — 84112 EVAL AMNIOTIC FLUID PROTEIN: CPT | Performed by: OBSTETRICS & GYNECOLOGY

## 2019-11-02 PROCEDURE — 80307 DRUG TEST PRSMV CHEM ANLYZR: CPT | Performed by: OBSTETRICS & GYNECOLOGY

## 2019-11-02 PROCEDURE — G0463 HOSPITAL OUTPT CLINIC VISIT: HCPCS

## 2019-11-02 RX ORDER — SODIUM CHLORIDE, SODIUM LACTATE, POTASSIUM CHLORIDE, CALCIUM CHLORIDE 600; 310; 30; 20 MG/100ML; MG/100ML; MG/100ML; MG/100ML
INJECTION, SOLUTION INTRAVENOUS CONTINUOUS
Status: DISCONTINUED | OUTPATIENT
Start: 2019-11-02 | End: 2019-11-02 | Stop reason: HOSPADM

## 2019-11-02 RX ORDER — VITAMIN A ACETATE, .BETA.-CAROTENE, ASCORBIC ACID, CHOLECALCIFEROL, .ALPHA.-TOCOPHEROL ACETATE, DL-, THIAMINE MONONITRATE, RIBOFLAVIN, NIACINAMIDE, PYRIDOXINE HYDROCHLORIDE, FOLIC ACID, CYANOCOBALAMIN, CALCIUM CARBONATE, FERROUS FUMARATE, ZINC OXIDE, AND CUPRIC OXIDE 2000; 2000; 120; 400; 22; 1.84; 3; 20; 10; 1; 12; 200; 27; 25; 2 [IU]/1; [IU]/1; MG/1; [IU]/1; MG/1; MG/1; MG/1; MG/1; MG/1; MG/1; UG/1; MG/1; MG/1; MG/1; MG/1
1 TABLET ORAL DAILY
Status: ON HOLD | COMMUNITY
End: 2022-08-12

## 2019-11-02 RX ORDER — DIPHENHYDRAMINE HCL 25 MG
12.5 TABLET ORAL
Status: ON HOLD | COMMUNITY
End: 2022-08-12

## 2019-11-03 NOTE — DISCHARGE INSTRUCTIONS
Discharge Instruction for Undelivered Patients      You were seen for: Labor Assessment  We Consulted: Dr Rose  You had (Test or Medicine):Fetal fibronectin, ROM+ and SVE     Diet:   Drink 8 to 12 glasses of liquids (milk, juice, water) every day.     Activity:  Activity as tolerated    Call your provider if you notice:  Swelling in your face or increased swelling in your hands or legs.  Headaches that are not relieved by Tylenol (acetaminophen).  Changes in your vision (blurring: seeing spots or stars.)  Nausea (sick to your stomach) and vomiting (throwing up).   Weight gain of 5 pounds or more per week.  Heartburn that doesn't go away.  Signs of bladder infection: pain when you urinate (use the toilet), need to go more often and more urgently.  The bag of newman (rupture of membranes) breaks, or you notice leaking in your underwear.  Bright red blood in your underwear.  Abdominal (lower belly) or stomach pain.  For first baby: Contractions (tightening) less than 5 minutes apart for one hour or more.  Second (plus) baby: Contractions (tightening) less than 10 minutes apart and getting stronger.  *If less than 34 weeks: Contractions (tightenings) more than 6 times in one hour.  Increase or change in vaginal discharge (note the color and amount)  Other: Call your MD for any questions or concerns    Follow-up:  As scheduled in the clinic

## 2019-12-19 ENCOUNTER — HOSPITAL ENCOUNTER (OUTPATIENT)
Dept: OBGYN | Facility: HOSPITAL | Age: 29
Discharge: HOME | End: 2019-12-19
Attending: OBSTETRICS & GYNECOLOGY | Admitting: OBSTETRICS & GYNECOLOGY

## 2019-12-19 LAB
ALBUMIN UR-MCNC: ABNORMAL MG/DL
AMPHETAMINES UR QL SCN: ABNORMAL
APPEARANCE UR: CLEAR
BACTERIA #/AREA URNS HPF: ABNORMAL HPF
BARBITURATES UR QL: ABNORMAL
BENZODIAZ UR QL: ABNORMAL
BILIRUB UR QL STRIP: NEGATIVE
CANNABINOIDS UR QL SCN: ABNORMAL
CLUE CELLS: ABNORMAL
COCAINE UR QL: ABNORMAL
COLOR UR AUTO: YELLOW
CREAT UR-MCNC: 169.1 MG/DL
ETHANOL UR CFM-MCNC: <10 MG/DL
GLUCOSE UR STRIP-MCNC: NEGATIVE MG/DL
HGB UR QL STRIP: NEGATIVE
HYALINE CASTS #/AREA URNS LPF: ABNORMAL LPF
KETONES UR STRIP-MCNC: ABNORMAL MG/DL
LEUKOCYTE ESTERASE UR QL STRIP: NEGATIVE
METHADONE UR QL SCN: ABNORMAL
MUCOUS THREADS #/AREA URNS LPF: ABNORMAL LPF
NITRATE UR QL: NEGATIVE
OPIATES UR QL SCN: ABNORMAL
OXYCODONE UR QL: ABNORMAL
PCP UR QL SCN: ABNORMAL
PH UR STRIP: 6 [PH] (ref 4.5–8)
RBC #/AREA URNS AUTO: ABNORMAL HPF
SP GR UR STRIP: 1.03 (ref 1–1.03)
SQUAMOUS #/AREA URNS AUTO: ABNORMAL LPF
TRANS CELLS #/AREA URNS HPF: ABNORMAL LPF
TRICHOMONAS, WET PREP: ABNORMAL
UROBILINOGEN UR STRIP-ACNC: ABNORMAL
WBC #/AREA URNS AUTO: ABNORMAL HPF
YEAST, WET PREP: ABNORMAL

## 2019-12-19 ASSESSMENT — MIFFLIN-ST. JEOR: SCORE: 1589.51

## 2019-12-20 LAB
ALLERGIC TO PENICILLIN: NO
GP B STREP DNA SPEC QL NAA+PROBE: POSITIVE

## 2019-12-23 LAB
6MAM UR CFM-MCNC: <10 NG/ML
CODEINE UR CFM-MCNC: <20 NG/ML
HYDROCODONE UR CFM-MCNC: <20 NG/ML
HYDROMORPHONE UR CFM-MCNC: <20 NG/ML
MORPHINE UR CFM-MCNC: 25 NG/ML
NORHYDROCODONE UR CFM-MCNC: 32 NG/ML
NOROXYCODONE UR CFM-MCNC: <20 NG/ML
NOROXYMORPHONE UR QL SCN: <20 NG/ML
OXYCODONE UR CFM-MCNC: <20 NG/ML
OXYMORPHONE UR CFM-MCNC: <20 NG/ML

## 2020-01-24 ENCOUNTER — ANESTHESIA - HEALTHEAST (OUTPATIENT)
Dept: OBGYN | Facility: HOSPITAL | Age: 30
End: 2020-01-24

## 2020-01-27 ENCOUNTER — AMBULATORY - HEALTHEAST (OUTPATIENT)
Dept: OTHER | Facility: CLINIC | Age: 30
End: 2020-01-27

## 2020-01-27 ENCOUNTER — HOME CARE/HOSPICE - HEALTHEAST (OUTPATIENT)
Dept: HOME HEALTH SERVICES | Facility: HOME HEALTH | Age: 30
End: 2020-01-27

## 2020-01-27 ENCOUNTER — DOCUMENTATION ONLY (OUTPATIENT)
Dept: OTHER | Facility: CLINIC | Age: 30
End: 2020-01-27

## 2020-01-28 ENCOUNTER — HOME CARE/HOSPICE - HEALTHEAST (OUTPATIENT)
Dept: HOME HEALTH SERVICES | Facility: HOME HEALTH | Age: 30
End: 2020-01-28

## 2020-12-18 ENCOUNTER — RECORDS - HEALTHEAST (OUTPATIENT)
Dept: LAB | Facility: CLINIC | Age: 30
End: 2020-12-18

## 2020-12-20 LAB — BACTERIA SPEC CULT: NORMAL

## 2021-01-26 ENCOUNTER — RECORDS - HEALTHEAST (OUTPATIENT)
Dept: LAB | Facility: CLINIC | Age: 31
End: 2021-01-26

## 2021-01-26 LAB — HIV 1+2 AB+HIV1 P24 AG SERPL QL IA: NEGATIVE

## 2021-01-27 LAB — T PALLIDUM AB SER QL: NEGATIVE

## 2021-01-28 LAB
C TRACH DNA SPEC QL PROBE+SIG AMP: NEGATIVE
N GONORRHOEA DNA SPEC QL NAA+PROBE: NEGATIVE

## 2021-05-27 VITALS
DIASTOLIC BLOOD PRESSURE: 82 MMHG | RESPIRATION RATE: 18 BRPM | HEART RATE: 80 BPM | OXYGEN SATURATION: 98 % | TEMPERATURE: 97.9 F | SYSTOLIC BLOOD PRESSURE: 106 MMHG

## 2021-05-28 ENCOUNTER — RECORDS - HEALTHEAST (OUTPATIENT)
Dept: LAB | Facility: CLINIC | Age: 31
End: 2021-05-28

## 2021-05-28 LAB — HIV 1+2 AB+HIV1 P24 AG SERPL QL IA: NEGATIVE

## 2021-05-29 LAB
BACTERIA SPEC CULT: NO GROWTH
C TRACH DNA SPEC QL NAA+PROBE: NEGATIVE
N GONORRHOEA DNA SPEC QL NAA+PROBE: NEGATIVE
SPEC DESCRIPTION: NORMAL
SPECIMEN DESCRIPTION: NORMAL
T PALLIDUM AB SER QL: NEGATIVE

## 2021-05-30 NOTE — TELEPHONE ENCOUNTER
"Pt reports she is 10 weeks pregnant. Scooted backwards in the tub while lying down and hit head about 15 minutes ago. No loss of consciouness or any other neuro changes per pt. Pt reports slight headache \"2\". No visible injury.or any other acute symptoms per pt. Very slight swelling per pt < 2 inches. Pt states she took a Unisom before getting in the tub. Pt states \"I'm a hypochondriac\".     Advised pt per Care Advice to have someone call her in two hours. If weakness, numbness, severe pain, vision changes, vomiting or slurred speech occur call back.    Pt verbalizes understanding and agrees to plan.     Reason for Disposition    Scalp swelling, bruise or pain    Protocols used: HEAD INJURY-A-AH      "

## 2021-05-31 ENCOUNTER — RECORDS - HEALTHEAST (OUTPATIENT)
Dept: ADMINISTRATIVE | Facility: CLINIC | Age: 31
End: 2021-05-31

## 2021-06-01 ENCOUNTER — RECORDS - HEALTHEAST (OUTPATIENT)
Dept: ADMINISTRATIVE | Facility: CLINIC | Age: 31
End: 2021-06-01

## 2021-06-02 VITALS — BODY MASS INDEX: 25.75 KG/M2 | WEIGHT: 150 LBS

## 2021-06-02 VITALS — BODY MASS INDEX: 25.75 KG/M2 | HEIGHT: 64 IN

## 2021-06-04 VITALS — HEIGHT: 64 IN | BODY MASS INDEX: 33.29 KG/M2 | WEIGHT: 195 LBS

## 2021-06-04 NOTE — PROGRESS NOTES
Pt states she had tooth extraction Monday and has used Tylenol # 3 for pain since then. Informed pt that Utox was sent as routine per her history of methamphetamine use, but that this narcotic use will be noted in her chart also.   Karen J Schoenberg

## 2021-06-04 NOTE — PROGRESS NOTES
Pt states history of  birth x 2 at 30 and 33 wk gestation. Pt did receive betamethasome x 2 injections this pregnancy around the 28 weeks.     Verbalizes History of methamphetamine use x 1 this pregancy.   Efm on, Admission triage  Done.   Karen J Schoenberg

## 2021-06-05 NOTE — ANESTHESIA PROCEDURE NOTES
Epidural Block    Patient location during procedure: OB  Time Called: 1/24/2020 9:45 PM  Reason for Block:labor epidural  Staffing:  Performing  Anesthesiologist: Gracia Terrell MD  Preanesthetic Checklist  Completed: patient identified, risks, benefits, and alternatives discussed, timeout performed, consent obtained, at patient's request, airway assessed, oxygen available, suction available, emergency drugs available and hand hygiene performed  Procedure  Patient position: sitting  Prep: ChloraPrep and site prepped and draped  Patient monitoring: continuous pulse oximetry, heart rate and blood pressure  Approach: midline  Location: L3-L4  Injection technique: LIZ saline  Number of Attempts:1  Needle  Needle type: Max   Needle gauge: 18 G     Catheter in Space: 5  Assessment  Sensory level:  No complications

## 2021-06-05 NOTE — ANESTHESIA POSTPROCEDURE EVALUATION
Patient: Eden Castro  * No procedures listed *  Anesthesia type: epidural    Patient location: Labor and Delivery  Last vitals: No vitals data found for the desired time range.    Post vital signs: stable  Level of consciousness: awake and responds to simple questions  Post-anesthesia pain: pain controlled  Post-anesthesia nausea and vomiting: no  Pulmonary: unassisted, return to baseline  Cardiovascular: stable and blood pressure at baseline  Hydration: adequate  Anesthetic events: no    QCDR Measures:  ASA# 11 - Lory-op Cardiac Arrest: ASA11B - Patient did NOT experience unanticipated cardiac arrest  ASA# 12 - Lory-op Mortality Rate: ASA12B - Patient did NOT die  ASA# 13 - PACU Re-Intubation Rate: NA - No ETT / LMA used for case  ASA# 10 - Composite Anes Safety: ASA10A - No serious adverse event    Additional Notes:  No  Ha. Mild soreness at insertion site. Normal return of strength and sensation to lower extremities.
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
## 2021-07-14 PROBLEM — Z34.90 PREGNANT: Status: RESOLVED | Noted: 2020-01-24 | Resolved: 2020-01-27

## 2021-10-08 ENCOUNTER — LAB REQUISITION (OUTPATIENT)
Dept: LAB | Facility: CLINIC | Age: 31
End: 2021-10-08
Payer: COMMERCIAL

## 2021-10-08 DIAGNOSIS — N89.8 OTHER SPECIFIED NONINFLAMMATORY DISORDERS OF VAGINA: ICD-10-CM

## 2021-10-08 PROCEDURE — 87491 CHLMYD TRACH DNA AMP PROBE: CPT | Mod: ORL | Performed by: NURSE PRACTITIONER

## 2021-10-08 PROCEDURE — 87591 N.GONORRHOEAE DNA AMP PROB: CPT | Mod: ORL | Performed by: NURSE PRACTITIONER

## 2021-10-09 LAB
C TRACH DNA SPEC QL NAA+PROBE: NEGATIVE
N GONORRHOEA DNA SPEC QL NAA+PROBE: POSITIVE

## 2021-12-01 ENCOUNTER — LAB REQUISITION (OUTPATIENT)
Dept: LAB | Facility: CLINIC | Age: 31
End: 2021-12-01
Payer: COMMERCIAL

## 2021-12-01 DIAGNOSIS — R11.0 NAUSEA: ICD-10-CM

## 2021-12-01 DIAGNOSIS — Z20.822 CONTACT WITH AND (SUSPECTED) EXPOSURE TO COVID-19: ICD-10-CM

## 2021-12-01 DIAGNOSIS — Z71.1 PERSON WITH FEARED HEALTH COMPLAINT IN WHOM NO DIAGNOSIS IS MADE: ICD-10-CM

## 2021-12-01 LAB
ALBUMIN SERPL-MCNC: 4.1 G/DL (ref 3.5–5)
ALP SERPL-CCNC: 66 U/L (ref 45–120)
ALT SERPL W P-5'-P-CCNC: 18 U/L (ref 0–45)
ANION GAP SERPL CALCULATED.3IONS-SCNC: 11 MMOL/L (ref 5–18)
AST SERPL W P-5'-P-CCNC: 14 U/L (ref 0–40)
BILIRUB SERPL-MCNC: 1 MG/DL (ref 0–1)
BUN SERPL-MCNC: 9 MG/DL (ref 8–22)
CALCIUM SERPL-MCNC: 9.2 MG/DL (ref 8.5–10.5)
CHLORIDE BLD-SCNC: 107 MMOL/L (ref 98–107)
CO2 SERPL-SCNC: 22 MMOL/L (ref 22–31)
CREAT SERPL-MCNC: 0.92 MG/DL (ref 0.6–1.1)
GFR SERPL CREATININE-BSD FRML MDRD: 84 ML/MIN/1.73M2
GLUCOSE BLD-MCNC: 95 MG/DL (ref 70–125)
HIV 1+2 AB+HIV1 P24 AG SERPL QL IA: NEGATIVE
POTASSIUM BLD-SCNC: 4 MMOL/L (ref 3.5–5)
PROT SERPL-MCNC: 6.5 G/DL (ref 6–8)
SODIUM SERPL-SCNC: 140 MMOL/L (ref 136–145)

## 2021-12-01 PROCEDURE — U0005 INFEC AGEN DETEC AMPLI PROBE: HCPCS | Mod: ORL | Performed by: NURSE PRACTITIONER

## 2021-12-01 PROCEDURE — 87591 N.GONORRHOEAE DNA AMP PROB: CPT | Mod: ORL | Performed by: NURSE PRACTITIONER

## 2021-12-01 PROCEDURE — 87491 CHLMYD TRACH DNA AMP PROBE: CPT | Mod: ORL | Performed by: NURSE PRACTITIONER

## 2021-12-01 PROCEDURE — 80053 COMPREHEN METABOLIC PANEL: CPT | Mod: ORL | Performed by: NURSE PRACTITIONER

## 2021-12-01 PROCEDURE — 87389 HIV-1 AG W/HIV-1&-2 AB AG IA: CPT | Mod: ORL | Performed by: NURSE PRACTITIONER

## 2021-12-02 LAB — SARS-COV-2 RNA RESP QL NAA+PROBE: NEGATIVE

## 2021-12-03 LAB
C TRACH DNA SPEC QL NAA+PROBE: NEGATIVE
N GONORRHOEA DNA SPEC QL NAA+PROBE: NEGATIVE

## 2022-03-29 ENCOUNTER — LAB REQUISITION (OUTPATIENT)
Dept: LAB | Facility: CLINIC | Age: 32
End: 2022-03-29

## 2022-03-29 DIAGNOSIS — Z71.1 PERSON WITH FEARED HEALTH COMPLAINT IN WHOM NO DIAGNOSIS IS MADE: ICD-10-CM

## 2022-03-29 DIAGNOSIS — N91.2 AMENORRHEA, UNSPECIFIED: ICD-10-CM

## 2022-03-29 LAB — HCG SERPL-ACNC: 198 MLU/ML (ref 0–4)

## 2022-03-29 PROCEDURE — 87491 CHLMYD TRACH DNA AMP PROBE: CPT | Performed by: NURSE PRACTITIONER

## 2022-03-29 PROCEDURE — 84702 CHORIONIC GONADOTROPIN TEST: CPT | Performed by: NURSE PRACTITIONER

## 2022-03-31 LAB
C TRACH DNA SPEC QL PROBE+SIG AMP: NEGATIVE
N GONORRHOEA DNA SPEC QL NAA+PROBE: NEGATIVE

## 2022-04-01 ENCOUNTER — LAB REQUISITION (OUTPATIENT)
Dept: LAB | Facility: CLINIC | Age: 32
End: 2022-04-01

## 2022-04-01 DIAGNOSIS — N91.2 AMENORRHEA, UNSPECIFIED: ICD-10-CM

## 2022-04-01 LAB — HCG SERPL-ACNC: 925 MLU/ML (ref 0–4)

## 2022-04-01 PROCEDURE — 84702 CHORIONIC GONADOTROPIN TEST: CPT | Performed by: NURSE PRACTITIONER

## 2022-04-11 ENCOUNTER — HOSPITAL ENCOUNTER (EMERGENCY)
Facility: CLINIC | Age: 32
Discharge: HOME | End: 2022-04-11
Payer: COMMERCIAL

## 2022-04-11 VITALS
DIASTOLIC BLOOD PRESSURE: 69 MMHG | RESPIRATION RATE: 18 BRPM | SYSTOLIC BLOOD PRESSURE: 119 MMHG | TEMPERATURE: 98 F | OXYGEN SATURATION: 100 % | BODY MASS INDEX: 31.24 KG/M2 | HEART RATE: 81 BPM | WEIGHT: 182 LBS

## 2022-04-11 NOTE — ED TRIAGE NOTES
Patient reports that she is pregnant, not sure how far exactly, last period end of Feb, having nausea, occasional vomiting, denies pain.  Mervat Candelario RN.......4/11/2022 12:20 PM

## 2022-04-19 ENCOUNTER — HOSPITAL ENCOUNTER (EMERGENCY)
Facility: HOSPITAL | Age: 32
Discharge: HOME | End: 2022-04-19
Attending: EMERGENCY MEDICINE | Admitting: EMERGENCY MEDICINE
Payer: COMMERCIAL

## 2022-04-19 VITALS
RESPIRATION RATE: 12 BRPM | HEIGHT: 64 IN | OXYGEN SATURATION: 100 % | HEART RATE: 68 BPM | DIASTOLIC BLOOD PRESSURE: 68 MMHG | TEMPERATURE: 98.3 F | BODY MASS INDEX: 31.07 KG/M2 | WEIGHT: 182 LBS | SYSTOLIC BLOOD PRESSURE: 109 MMHG

## 2022-04-19 DIAGNOSIS — O21.9 NAUSEA/VOMITING IN PREGNANCY: ICD-10-CM

## 2022-04-19 PROBLEM — F41.0 PANIC DISORDER: Status: ACTIVE | Noted: 2022-04-19

## 2022-04-19 PROBLEM — G47.8 SLEEP DYSFUNCTION WITH AROUSAL DISTURBANCE: Status: ACTIVE | Noted: 2022-04-19

## 2022-04-19 PROBLEM — Q83.9 CONGENITAL ANOMALY OF BREAST: Status: ACTIVE | Noted: 2022-04-19

## 2022-04-19 PROBLEM — F41.9 ANXIETY: Status: ACTIVE | Noted: 2022-04-19

## 2022-04-19 PROBLEM — F31.60 MIXED BIPOLAR I DISORDER (H): Status: ACTIVE | Noted: 2022-04-19

## 2022-04-19 PROBLEM — N91.2 AMENORRHEA: Status: ACTIVE | Noted: 2022-04-19

## 2022-04-19 PROBLEM — F31.9 BIPOLAR DISORDER (H): Status: ACTIVE | Noted: 2022-04-19

## 2022-04-19 PROBLEM — O02.1 MISSED ABORTION: Status: ACTIVE | Noted: 2018-01-15

## 2022-04-19 PROBLEM — G89.29 CHRONIC PAIN: Status: ACTIVE | Noted: 2022-04-19

## 2022-04-19 PROBLEM — G25.81 RESTLESS LEGS: Status: ACTIVE | Noted: 2022-04-19

## 2022-04-19 PROBLEM — M54.50 LOW BACK PAIN: Status: ACTIVE | Noted: 2022-04-19

## 2022-04-19 PROBLEM — F19.11 SUBSTANCE ABUSE IN REMISSION (H): Status: ACTIVE | Noted: 2022-04-19

## 2022-04-19 LAB
ALBUMIN SERPL-MCNC: 3.3 G/DL (ref 3.5–5)
ALP SERPL-CCNC: 50 U/L (ref 45–120)
ALT SERPL W P-5'-P-CCNC: 25 U/L (ref 0–45)
ANION GAP SERPL CALCULATED.3IONS-SCNC: 5 MMOL/L (ref 5–18)
AST SERPL W P-5'-P-CCNC: 13 U/L (ref 0–40)
BILIRUB DIRECT SERPL-MCNC: 0.2 MG/DL
BILIRUB SERPL-MCNC: 0.6 MG/DL (ref 0–1)
BUN SERPL-MCNC: 8 MG/DL (ref 8–22)
CALCIUM SERPL-MCNC: 8.2 MG/DL (ref 8.5–10.5)
CHLORIDE BLD-SCNC: 111 MMOL/L (ref 98–107)
CO2 SERPL-SCNC: 22 MMOL/L (ref 22–31)
CREAT SERPL-MCNC: 0.65 MG/DL (ref 0.6–1.1)
ERYTHROCYTE [DISTWIDTH] IN BLOOD BY AUTOMATED COUNT: 13.1 % (ref 10–15)
GFR SERPL CREATININE-BSD FRML MDRD: >90 ML/MIN/1.73M2
GLUCOSE BLD-MCNC: 72 MG/DL (ref 70–125)
HCT VFR BLD AUTO: 36.5 % (ref 35–47)
HGB BLD-MCNC: 12.3 G/DL (ref 11.7–15.7)
HOLD SPECIMEN: NORMAL
LIPASE SERPL-CCNC: 35 U/L (ref 0–52)
MAGNESIUM SERPL-MCNC: 1.7 MG/DL (ref 1.8–2.6)
MCH RBC QN AUTO: 29.1 PG (ref 26.5–33)
MCHC RBC AUTO-ENTMCNC: 33.7 G/DL (ref 31.5–36.5)
MCV RBC AUTO: 87 FL (ref 78–100)
PLATELET # BLD AUTO: 221 10E3/UL (ref 150–450)
POTASSIUM BLD-SCNC: 3.4 MMOL/L (ref 3.5–5)
PROT SERPL-MCNC: 5.8 G/DL (ref 6–8)
RBC # BLD AUTO: 4.22 10E6/UL (ref 3.8–5.2)
SODIUM SERPL-SCNC: 138 MMOL/L (ref 136–145)
WBC # BLD AUTO: 10.9 10E3/UL (ref 4–11)

## 2022-04-19 PROCEDURE — 250N000011 HC RX IP 250 OP 636: Performed by: EMERGENCY MEDICINE

## 2022-04-19 PROCEDURE — 85027 COMPLETE CBC AUTOMATED: CPT | Performed by: EMERGENCY MEDICINE

## 2022-04-19 PROCEDURE — 96375 TX/PRO/DX INJ NEW DRUG ADDON: CPT

## 2022-04-19 PROCEDURE — 96361 HYDRATE IV INFUSION ADD-ON: CPT

## 2022-04-19 PROCEDURE — 36415 COLL VENOUS BLD VENIPUNCTURE: CPT | Performed by: EMERGENCY MEDICINE

## 2022-04-19 PROCEDURE — 96374 THER/PROPH/DIAG INJ IV PUSH: CPT

## 2022-04-19 PROCEDURE — 83690 ASSAY OF LIPASE: CPT | Performed by: EMERGENCY MEDICINE

## 2022-04-19 PROCEDURE — 99284 EMERGENCY DEPT VISIT MOD MDM: CPT | Mod: 25

## 2022-04-19 PROCEDURE — 258N000003 HC RX IP 258 OP 636: Performed by: EMERGENCY MEDICINE

## 2022-04-19 PROCEDURE — 83735 ASSAY OF MAGNESIUM: CPT | Performed by: EMERGENCY MEDICINE

## 2022-04-19 PROCEDURE — 82248 BILIRUBIN DIRECT: CPT | Performed by: EMERGENCY MEDICINE

## 2022-04-19 RX ORDER — DIPHENHYDRAMINE HYDROCHLORIDE 50 MG/ML
25 INJECTION INTRAMUSCULAR; INTRAVENOUS ONCE
Status: COMPLETED | OUTPATIENT
Start: 2022-04-19 | End: 2022-04-19

## 2022-04-19 RX ORDER — ONDANSETRON 2 MG/ML
4 INJECTION INTRAMUSCULAR; INTRAVENOUS ONCE
Status: DISCONTINUED | OUTPATIENT
Start: 2022-04-19 | End: 2022-04-19 | Stop reason: HOSPADM

## 2022-04-19 RX ADMIN — SODIUM CHLORIDE 1000 ML: 9 INJECTION, SOLUTION INTRAVENOUS at 07:53

## 2022-04-19 RX ADMIN — PROCHLORPERAZINE EDISYLATE 10 MG: 5 INJECTION INTRAMUSCULAR; INTRAVENOUS at 07:56

## 2022-04-19 RX ADMIN — DIPHENHYDRAMINE HYDROCHLORIDE 25 MG: 50 INJECTION, SOLUTION INTRAMUSCULAR; INTRAVENOUS at 08:00

## 2022-04-19 ASSESSMENT — ENCOUNTER SYMPTOMS
DIARRHEA: 0
TROUBLE SWALLOWING: 0
NAUSEA: 1
SHORTNESS OF BREATH: 0
COUGH: 0
DYSURIA: 0
FEVER: 0
VOMITING: 1
ABDOMINAL PAIN: 0

## 2022-04-19 NOTE — ED TRIAGE NOTES
Pt approx 7 weeks pregnant coming in with n/v, unable to keep anything down.  Feels she is dehydrated.  Last zofran at 0630 this am. C/o headache took tylenol at same time of zofran.
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
## 2022-04-19 NOTE — DISCHARGE INSTRUCTIONS
Keep your appointment with your OB clinic today.    Return emergency department if you have concerns with your vomiting, concerns for dehydration, fever, abdominal pain, vaginal bleeding, or any other concerns.    Thank you for choosing [facility].  It has been my pleasure caring for you today.     ~Dr. Renaldo MD

## 2022-05-14 ENCOUNTER — HOME INFUSION (PRE-WILLOW HOME INFUSION) (OUTPATIENT)
Dept: PHARMACY | Facility: CLINIC | Age: 32
End: 2022-05-14

## 2022-05-19 ENCOUNTER — HOME INFUSION (PRE-WILLOW HOME INFUSION) (OUTPATIENT)
Dept: PHARMACY | Facility: CLINIC | Age: 32
End: 2022-05-19

## 2022-05-20 ENCOUNTER — HOME INFUSION (PRE-WILLOW HOME INFUSION) (OUTPATIENT)
Dept: PHARMACY | Facility: CLINIC | Age: 32
End: 2022-05-20

## 2022-05-23 ENCOUNTER — HOME INFUSION (PRE-WILLOW HOME INFUSION) (OUTPATIENT)
Dept: PHARMACY | Facility: CLINIC | Age: 32
End: 2022-05-23
Payer: COMMERCIAL

## 2022-05-25 NOTE — PROGRESS NOTES
This is a recent snapshot of the patient's [facility] medical record.  For current drug dose and complete information and questions, call [phone] or In Basket pool, fv home infusion ([id number])  CSN Number:  [id number]

## 2022-05-30 ENCOUNTER — HOME INFUSION (PRE-WILLOW HOME INFUSION) (OUTPATIENT)
Dept: PHARMACY | Facility: CLINIC | Age: 32
End: 2022-05-30
Payer: COMMERCIAL

## 2022-05-31 NOTE — PROGRESS NOTES
This is a recent snapshot of the patient's [facility] medical record.  For current drug dose and complete information and questions, call [phone] or In Basket pool, fv home infusion ([id number])  CSN Number:  [id number]

## 2022-06-04 ENCOUNTER — HOME INFUSION (PRE-WILLOW HOME INFUSION) (OUTPATIENT)
Dept: PHARMACY | Facility: CLINIC | Age: 32
End: 2022-06-04
Payer: COMMERCIAL

## 2022-06-06 ENCOUNTER — HOME INFUSION (PRE-WILLOW HOME INFUSION) (OUTPATIENT)
Dept: PHARMACY | Facility: CLINIC | Age: 32
End: 2022-06-06
Payer: COMMERCIAL

## 2022-06-07 NOTE — PROGRESS NOTES
This is a recent snapshot of the patient's [facility] medical record.  For current drug dose and complete information and questions, call [phone] or In Basket pool, fv home infusion ([id number])  CSN Number:  [id number]

## 2022-06-08 NOTE — PROGRESS NOTES
This is a recent snapshot of the patient's [facility] medical record.  For current drug dose and complete information and questions, call [phone] or In [facility] pool, fv home infusion ([id number])  CSN Number:  [id number]

## 2022-06-13 ENCOUNTER — HOSPITAL ENCOUNTER (EMERGENCY)
Facility: HOSPITAL | Age: 32
Discharge: LEFT BEFORE DISCHARGE | End: 2022-06-13
Payer: COMMERCIAL

## 2022-06-13 ENCOUNTER — APPOINTMENT (OUTPATIENT)
Dept: ULTRASOUND IMAGING | Facility: CLINIC | Age: 32
End: 2022-06-13
Attending: EMERGENCY MEDICINE
Payer: COMMERCIAL

## 2022-06-13 ENCOUNTER — NURSE TRIAGE (OUTPATIENT)
Dept: NURSING | Facility: CLINIC | Age: 32
End: 2022-06-13
Payer: COMMERCIAL

## 2022-06-13 ENCOUNTER — HOSPITAL ENCOUNTER (EMERGENCY)
Facility: CLINIC | Age: 32
Discharge: HOME | End: 2022-06-13
Attending: EMERGENCY MEDICINE | Admitting: EMERGENCY MEDICINE
Payer: COMMERCIAL

## 2022-06-13 VITALS
HEART RATE: 101 BPM | BODY MASS INDEX: 29.71 KG/M2 | RESPIRATION RATE: 20 BRPM | HEIGHT: 64 IN | OXYGEN SATURATION: 97 % | WEIGHT: 174 LBS | TEMPERATURE: 98.1 F | SYSTOLIC BLOOD PRESSURE: 113 MMHG | DIASTOLIC BLOOD PRESSURE: 79 MMHG

## 2022-06-13 DIAGNOSIS — M54.50 ACUTE MIDLINE LOW BACK PAIN WITHOUT SCIATICA: ICD-10-CM

## 2022-06-13 DIAGNOSIS — Z33.1 PREGNANCY, INCIDENTAL: ICD-10-CM

## 2022-06-13 PROCEDURE — 76805 OB US >/= 14 WKS SNGL FETUS: CPT

## 2022-06-13 PROCEDURE — 99284 EMERGENCY DEPT VISIT MOD MDM: CPT | Mod: 25

## 2022-06-13 ASSESSMENT — ENCOUNTER SYMPTOMS
FREQUENCY: 0
DYSURIA: 0
HEMATURIA: 0
BACK PAIN: 1

## 2022-06-14 ENCOUNTER — PATIENT OUTREACH (OUTPATIENT)
Dept: CARE COORDINATION | Facility: CLINIC | Age: 32
End: 2022-06-14
Payer: COMMERCIAL

## 2022-06-14 DIAGNOSIS — Z71.89 OTHER SPECIFIED COUNSELING: ICD-10-CM
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
## 2022-06-14 NOTE — ED NOTES
Patient reports that she will do a urine at MD tomorrow. Advised if she does not do a urine, we might be missing some important information regarding symptoms.  Patient verbalized understanding and does not want to complete. Provider informed

## 2022-06-14 NOTE — ED TRIAGE NOTES
"Arrives to ED with c/o lower back cramping that began last night. Reports began after having intercourse. Pt is 15 weeks pregnant. . Reports checked cervix at home, \"it feels shortened, wide and open\". Denies vaginal bleeding.      Triage Assessment     Row Name 22 8619       Triage Assessment (Adult)    Airway WDL WDL       Respiratory WDL    Respiratory WDL WDL       Skin Circulation/Temperature WDL    Skin Circulation/Temperature WDL WDL       Cardiac WDL    Cardiac WDL X;rhythm    Cardiac Rhythm tachycardic       Peripheral/Neurovascular WDL    Peripheral Neurovascular WDL WDL       Cognitive/Neuro/Behavioral WDL    Cognitive/Neuro/Behavioral WDL WDL              "

## 2022-06-14 NOTE — PROGRESS NOTES
[facility]: Post-Discharge Note  SITUATION                                                      Admission:    Admission Date: 06/13/22   Reason for Admission: Back pain  Discharge:   Discharge Date: 06/13/22  Discharge Diagnosis: Acute midline low back pain without sciatica    BACKGROUND                                                      Per hospital discharge summary and inpatient provider notes:  Pertinent Labs & Imaging studies reviewed. (See chart for details)  31 year old female presents to the Emergency Department for evaluation of low back and pelvic pain.  Patient is pregnant.  Ultrasound shows IUP.  No abnormalities.  Did offer to do a UA.  Patient refused at this time.  Did discuss with her that UTI can show a greater risk of miscarriage.  She still states she will follow-up with her OB tomorrow and get it done then.  Abdomen is otherwise nontender.  No signs of appendicitis.  No signs of torsion.  Patient will be discharged home.  Follow-up with primary.     10:38 PM I met with the patient to gather history and to perform my initial exam. I discussed the plan for care while in the Emergency Department. PPE: gloves, surgical mask, and eye protection.  11:05 PM Per nursing report, patient is declining her urinalysis. Patient states she has an appointment with her primary care provider tomorrow at 1400 and states she will do it then.  11:24 PM I rechecked and updated patient. Patient is still declining urinalysis. We discussed the plan for discharge and the patient is agreeable. Reviewed supportive cares, symptomatic treatment, outpatient follow up, and reasons to return to the Emergency Department. Patient to be discharged by ED RN.      At the conclusion of the encounter I discussed the results of all of the tests and the disposition. The questions were answered. The patient or family acknowledged understanding and was agreeable with the care plan.     ASSESSMENT      Enrollment  Primary Care Care  Coordination Status: Not a Candidate    Discharge Assessment  How are you doing now that you are home?: Pain in good spirits, said that she's okay.  Do you feel your condition is stable enough to be safe at home until your provider visit?: Yes  Does the patient have their discharge instructions? : Yes  Does the patient have questions regarding their discharge instructions? : No  Were you started on any new medications or were there changes to any of your previous medications? : No  Does the patient have all of their medications?: Yes  Do you have questions regarding any of your medications? : No  Do you have all of your needed medical supplies or equipment (DME)?  (i.e. oxygen tank, CPAP, cane, etc.): Yes  Discharge follow-up appointment scheduled within 14 calendar days? : Yes  Discharge Follow Up Appointment Date:  (Patient isn't sure of the date.)  Discharge Follow Up Appointment Scheduled with?: Specialty Care Provider    Post-op ([facility] CTA Only)  If the patient had a surgery or procedure, do they have any questions for a nurse?: No      PLAN                                                      Outpatient Plan:  None noted.    No future appointments.      For any urgent concerns, please contact our 24 hour nurse triage line: [phone] ([phone])       Mary Travis, CTA  [phone]  [facility]

## 2022-06-14 NOTE — TELEPHONE ENCOUNTER
"Pt was at [facility] and left after 1 hour of not being triaged yet and is driving to [facility] ED.    She asked if she should be seen for evaluation.  5th pregnancy, had sex yesterday, and after she felt something was wrong and she felt her cervix is open and soft.  Today it is even more open.  She is having cramping and increased vaginal secretions.  Rating pain 5-6/10.      She is 15 weeks pregnant so disposition is to ED vs L&D         Hodan Harley RN 22 9:15 PM  [facility] Nurse Advisor    Reason for Disposition    MODERATE-SEVERE abdominal pain    Additional Information    Negative: Passed out (i.e., lost consciousness, collapsed and was not responding)    Negative: Difficult to awaken or acting confused (e.g., disoriented, slurred speech)    Negative: Shock suspected (e.g., cold/pale/clammy skin, too weak to stand, low BP, rapid pulse)    Negative: [1] SEVERE abdominal pain (e.g., excruciating) AND [2] constant AND [3] present > 1 hour    Negative: Severe bleeding (e.g., continuous red blood from vagina, or large blood clots)    Negative: Umbilical cord hanging out of the vagina (shiny, white, curled appearance, \"like telephone cord\")    Negative: Uncontrollable urge to push (i.e., feels like baby is coming out now)    Negative: Can see baby    Negative: Sounds like a life-threatening emergency to the triager    Protocols used: PREGNANCY - LABOR - [id number]      "

## 2022-06-16 ENCOUNTER — HOME INFUSION (PRE-WILLOW HOME INFUSION) (OUTPATIENT)
Dept: PHARMACY | Facility: CLINIC | Age: 32
End: 2022-06-16

## 2022-06-27 ENCOUNTER — HOME INFUSION (PRE-WILLOW HOME INFUSION) (OUTPATIENT)
Dept: PHARMACY | Facility: CLINIC | Age: 32
End: 2022-06-27

## 2022-06-29 NOTE — PROGRESS NOTES
This is a recent snapshot of the patient's [facility] medical record.  For current drug dose and complete information and questions, call [phone] or In Basket pool, fv home infusion ([id number])  CSN Number:  [id number]

## 2022-06-30 ENCOUNTER — HOME INFUSION (PRE-WILLOW HOME INFUSION) (OUTPATIENT)
Dept: PHARMACY | Facility: CLINIC | Age: 32
End: 2022-06-30

## 2022-07-07 ENCOUNTER — HOME INFUSION (PRE-WILLOW HOME INFUSION) (OUTPATIENT)
Dept: PHARMACY | Facility: CLINIC | Age: 32
End: 2022-07-07

## 2022-07-11 NOTE — PROGRESS NOTES
This is a recent snapshot of the patient's [facility] medical record.  For current drug dose and complete information and questions, call [phone] or In [facility] pool, fv home infusion ([id number])  CSN Number:  [id number]

## 2022-08-12 ENCOUNTER — HOSPITAL ENCOUNTER (OUTPATIENT)
Facility: HOSPITAL | Age: 32
End: 2022-08-12
Admitting: OBSTETRICS & GYNECOLOGY
Payer: COMMERCIAL

## 2022-08-12 ENCOUNTER — HOSPITAL ENCOUNTER (OUTPATIENT)
Facility: HOSPITAL | Age: 32
Discharge: HOME | End: 2022-08-12
Attending: OBSTETRICS & GYNECOLOGY | Admitting: OBSTETRICS & GYNECOLOGY
Payer: COMMERCIAL

## 2022-08-12 VITALS
HEART RATE: 88 BPM | OXYGEN SATURATION: 97 % | HEIGHT: 64 IN | TEMPERATURE: 98.3 F | RESPIRATION RATE: 18 BRPM | BODY MASS INDEX: 31.41 KG/M2 | SYSTOLIC BLOOD PRESSURE: 133 MMHG | WEIGHT: 184 LBS | DIASTOLIC BLOOD PRESSURE: 69 MMHG

## 2022-08-12 LAB
ALBUMIN UR-MCNC: NEGATIVE MG/DL
AMORPH CRY #/AREA URNS HPF: ABNORMAL /HPF
APPEARANCE UR: ABNORMAL
BILIRUB UR QL STRIP: NEGATIVE
CLUE CELLS: ABNORMAL
COLOR UR AUTO: ABNORMAL
GLUCOSE UR STRIP-MCNC: NEGATIVE MG/DL
HGB UR QL STRIP: NEGATIVE
KETONES UR STRIP-MCNC: ABNORMAL MG/DL
LEUKOCYTE ESTERASE UR QL STRIP: NEGATIVE
MUCOUS THREADS #/AREA URNS LPF: PRESENT /LPF
NITRATE UR QL: NEGATIVE
PH UR STRIP: 7.5 [PH] (ref 5–7)
RBC URINE: 1 /HPF
SP GR UR STRIP: 1.02 (ref 1–1.03)
SQUAMOUS EPITHELIAL: 1 /HPF
TRICHOMONAS, WET PREP: ABNORMAL
UROBILINOGEN UR STRIP-MCNC: <2 MG/DL
WBC URINE: 1 /HPF
WBC'S/HIGH POWER FIELD, WET PREP: ABNORMAL
YEAST, WET PREP: ABNORMAL

## 2022-08-12 PROCEDURE — 81001 URINALYSIS AUTO W/SCOPE: CPT | Performed by: OBSTETRICS & GYNECOLOGY

## 2022-08-12 PROCEDURE — 87210 SMEAR WET MOUNT SALINE/INK: CPT | Performed by: OBSTETRICS & GYNECOLOGY

## 2022-08-12 RX ORDER — ONDANSETRON 8 MG/1
8 TABLET, FILM COATED ORAL EVERY 8 HOURS PRN
COMMUNITY
End: 2023-02-28

## 2022-08-12 RX ORDER — ACETAMINOPHEN 325 MG/1
325-650 TABLET ORAL EVERY 6 HOURS PRN
COMMUNITY

## 2022-08-12 ASSESSMENT — ACTIVITIES OF DAILY LIVING (ADL): ADLS_ACUITY_SCORE: 31

## 2022-08-13 NOTE — PROGRESS NOTES
Lab results returned and reviewed by Dr. Gonzalez.  Elevated WBC found on wet prep, provider sending antibiotic to patients discharge pharmacy.   Encouraging increase water intake.   Patient in agreement to plan of treatment and care.   Aware of signs and symptoms of labor for cause to return to hospital.   Discharge to home.

## 2022-08-13 NOTE — PROGRESS NOTES
"Patient arrived to [facility] at 1812 having called prior to arrival. Reporting contractions \"every 7 minutes, pelvic pressure and low back pain\"   Reports symptoms started several days ago, worsening after intercourse two days ago. No bleeding or leaking of fluid. Feeling baby move frequently.   She reports uncomplicated pregnancy with history of 29wk births.     Placed on external fetal monitor. Fetal heart rate 140bpm. Abdomen palpated soft. Patient given the button to theodora when she feels contractions and/or pressure.   VVS    Dr. Gonzalez updated via telephone. Received orders for SVE, UA and wet prep.   Cervix 0/0/-2 soft no discharge noted.   "

## 2022-08-13 NOTE — DISCHARGE INSTRUCTIONS
Discharge Instruction for Undelivered Patients      You were seen for:  contractions  We Consulted:   You had (Test or Medicine):Wet prep, urinalysis.     Diet:   Increase water intake      Activity:  No change     Call your provider if you notice:  Swelling in your face or increased swelling in your hands or legs.  Headaches that are not relieved by Tylenol (acetaminophen).  Changes in your vision (blurring: seeing spots or stars.)  Nausea (sick to your stomach) and vomiting (throwing up).   Weight gain of 5 pounds or more per week.  Heartburn that doesn't go away.  Signs of bladder infection: pain when you urinate (use the toilet), need to go more often and more urgently.  The bag of newman (rupture of membranes) breaks, or you notice leaking in your underwear.  Bright red blood in your underwear.  Abdominal (lower belly) or stomach pain.  For first baby: Contractions (tightening) less than 5 minutes apart for one hour or more.  Second (plus) baby: Contractions (tightening) less than 10 minutes apart and getting stronger.  *If less than 34 weeks: Contractions (tightening) more than 6 times in one hour.  Increase or change in vaginal discharge (note the color and amount)  Other: ***    Follow-up:  As previously scheduled.       Dr. Gonzalez is sending Antibiotic to your pharmacy.

## 2022-08-27 ENCOUNTER — HOSPITAL ENCOUNTER (OUTPATIENT)
Facility: HOSPITAL | Age: 32
Discharge: HOME | End: 2022-08-27
Attending: OBSTETRICS & GYNECOLOGY | Admitting: STUDENT IN AN ORGANIZED HEALTH CARE EDUCATION/TRAINING PROGRAM
Payer: COMMERCIAL

## 2022-08-27 ENCOUNTER — HOSPITAL ENCOUNTER (OUTPATIENT)
Facility: HOSPITAL | Age: 32
End: 2022-08-27
Admitting: STUDENT IN AN ORGANIZED HEALTH CARE EDUCATION/TRAINING PROGRAM
Payer: COMMERCIAL

## 2022-08-27 VITALS
DIASTOLIC BLOOD PRESSURE: 71 MMHG | SYSTOLIC BLOOD PRESSURE: 121 MMHG | HEART RATE: 96 BPM | TEMPERATURE: 97.9 F | RESPIRATION RATE: 18 BRPM

## 2022-08-27 PROBLEM — Z36.89 ENCOUNTER FOR TRIAGE IN PREGNANT PATIENT: Status: ACTIVE | Noted: 2019-11-02

## 2022-08-27 LAB
ALBUMIN UR-MCNC: NEGATIVE MG/DL
APPEARANCE UR: CLEAR
BILIRUB UR QL STRIP: NEGATIVE
CLUE CELLS: NORMAL
COLOR UR AUTO: ABNORMAL
FFN SPECIMEN INTEGRITY: NORMAL
FIBRONECTIN FETAL VAG QL: NEGATIVE
GLUCOSE UR STRIP-MCNC: 70 MG/DL
HGB UR QL STRIP: NEGATIVE
KETONES UR STRIP-MCNC: NEGATIVE MG/DL
LEUKOCYTE ESTERASE UR QL STRIP: NEGATIVE
NITRATE UR QL: NEGATIVE
PH UR STRIP: 6.5 [PH] (ref 5–7)
RUPTURE OF FETAL MEMBRANES BY ROM PLUS: NEGATIVE
SP GR UR STRIP: 1.02 (ref 1–1.03)
TRICHOMONAS, WET PREP: NORMAL
UROBILINOGEN UR STRIP-MCNC: <2 MG/DL
WBC'S/HIGH POWER FIELD, WET PREP: NORMAL
YEAST, WET PREP: NORMAL

## 2022-08-27 PROCEDURE — 87210 SMEAR WET MOUNT SALINE/INK: CPT | Performed by: STUDENT IN AN ORGANIZED HEALTH CARE EDUCATION/TRAINING PROGRAM

## 2022-08-27 PROCEDURE — 82731 ASSAY OF FETAL FIBRONECTIN: CPT | Performed by: STUDENT IN AN ORGANIZED HEALTH CARE EDUCATION/TRAINING PROGRAM

## 2022-08-27 PROCEDURE — G0463 HOSPITAL OUTPT CLINIC VISIT: HCPCS

## 2022-08-27 PROCEDURE — 84112 EVAL AMNIOTIC FLUID PROTEIN: CPT | Performed by: STUDENT IN AN ORGANIZED HEALTH CARE EDUCATION/TRAINING PROGRAM

## 2022-08-27 PROCEDURE — 81003 URINALYSIS AUTO W/O SCOPE: CPT | Performed by: STUDENT IN AN ORGANIZED HEALTH CARE EDUCATION/TRAINING PROGRAM

## 2022-08-27 PROCEDURE — 87653 STREP B DNA AMP PROBE: CPT | Performed by: STUDENT IN AN ORGANIZED HEALTH CARE EDUCATION/TRAINING PROGRAM

## 2022-08-27 RX ORDER — LIDOCAINE 40 MG/G
CREAM TOPICAL
Status: DISCONTINUED | OUTPATIENT
Start: 2022-08-27 | End: 2022-08-27 | Stop reason: HOSPADM

## 2022-08-27 ASSESSMENT — ACTIVITIES OF DAILY LIVING (ADL)
ADLS_ACUITY_SCORE: 35
ADLS_ACUITY_SCORE: 35

## 2022-08-27 NOTE — PROGRESS NOTES
Pt presented to [facility] c/o leaking fluid, cramping and frequent urination. Pt is 25.5 weeks, hx of  deliveries x2 at 30.0 and 33.0, pt is a . Vss, afebrile, monitors applied. Informed Dr. Aiken orders received.

## 2022-08-27 NOTE — PROGRESS NOTES
Triage Note:    S: patient is a 31 year old  who presented at 25  for evaluation of loss of fluid.  She had noted spurts of fluid, unsure if she was leaking urine or if her fluid leaking.  She has a history of  delivery x 2    O:  /71 (BP Location: Right arm, Patient Position: Semi-Thomas's, Cuff Size: Adult Regular)   Pulse 96   Temp 97.9  F (36.6  C) (Oral)   Resp 18   LMP 2022 (Approximate)     General: NAD    FHT reviewed: baseline 145, moderate variability, no accels, no decels  [place]: quiet    SVE per RN cl/th/hi    Wet prep neg  FFN neg      A/P:  IUP at 25 weeks  PTL evaluation: reassuring against PTL in the setting of closed cervix and negative FFN  ROM+ negative which is reassuring against ruptured membranes  Returned precautions reviewed      MD Brina

## 2022-08-27 NOTE — DISCHARGE INSTRUCTIONS
Discharge Instruction for Undelivered Patients      You were seen for: Membrane Assessment  We Consulted: Dr. Aiken  You had (Test or Medicine):Lab test     Diet:   Drink 8 to 12 glasses of liquids (milk, juice, water) every day.     Activity:  Call your doctor or nurse midwife if your baby is moving less than usual.     Call your provider if you notice:  Swelling in your face or increased swelling in your hands or legs.  Headaches that are not relieved by Tylenol (acetaminophen).  Changes in your vision (blurring: seeing spots or stars.)  Nausea (sick to your stomach) and vomiting (throwing up).   Weight gain of 5 pounds or more per week.  Heartburn that doesn't go away.  Signs of bladder infection: pain when you urinate (use the toilet), need to go more often and more urgently.  The bag of newman (rupture of membranes) breaks, or you notice leaking in your underwear.  Bright red blood in your underwear.  Abdominal (lower belly) or stomach pain.  For first baby: Contractions (tightening) less than 5 minutes apart for one hour or more.  Second (plus) baby: Contractions (tightening) less than 10 minutes apart and getting stronger.  *If less than 34 weeks: Contractions (tightening) more than 6 times in one hour.  Increase or change in vaginal discharge (note the color and amount)  Other: Follow up at next appointment.    Follow-up:  As scheduled in the clinic

## 2022-08-27 NOTE — PROGRESS NOTES
This is a recent snapshot of the patient's [facility] medical record.  For current drug dose and complete information and questions, call [phone] or In Basket pool, fv home infusion ([id number])  CSN Number:  [id number]

## 2022-08-27 NOTE — PROGRESS NOTES
This RN collected and sent, FFN, Wet Prep, GBS, UA, and ROM+ also performed a cervical exam, cervix is closed,thick and high.  Pt also said she had intercourse this morning.

## 2022-08-28 LAB — GP B STREP DNA SPEC QL NAA+PROBE: POSITIVE

## 2022-09-13 ENCOUNTER — LAB REQUISITION (OUTPATIENT)
Dept: LAB | Facility: CLINIC | Age: 32
End: 2022-09-13

## 2022-09-13 DIAGNOSIS — Z11.3 ENCOUNTER FOR SCREENING FOR INFECTIONS WITH A PREDOMINANTLY SEXUAL MODE OF TRANSMISSION: ICD-10-CM

## 2022-09-13 PROCEDURE — 86592 SYPHILIS TEST NON-TREP QUAL: CPT | Performed by: OBSTETRICS & GYNECOLOGY

## 2022-09-14 LAB — RPR SER QL: NONREACTIVE

## 2022-10-20 ENCOUNTER — HOSPITAL ENCOUNTER (INPATIENT)
Facility: HOSPITAL | Age: 32
LOS: 2 days | Discharge: HOME | End: 2022-10-22
Attending: OBSTETRICS & GYNECOLOGY | Admitting: OBSTETRICS & GYNECOLOGY
Payer: COMMERCIAL

## 2022-10-20 ENCOUNTER — TRANSFERRED RECORDS (OUTPATIENT)
Dept: HEALTH INFORMATION MANAGEMENT | Facility: CLINIC | Age: 32
End: 2022-10-20

## 2022-10-20 DIAGNOSIS — N30.00 ACUTE CYSTITIS WITHOUT HEMATURIA: Primary | ICD-10-CM

## 2022-10-20 PROBLEM — O60.00 PRETERM LABOR: Status: ACTIVE | Noted: 2022-10-20

## 2022-10-20 LAB
ABO/RH(D): NORMAL
ALBUMIN UR-MCNC: 20 MG/DL
ANTIBODY SCREEN: NEGATIVE
APPEARANCE UR: CLEAR
BASOPHILS # BLD AUTO: 0.1 10E3/UL (ref 0–0.2)
BASOPHILS NFR BLD AUTO: 0 %
BILIRUB UR QL STRIP: NEGATIVE
CLUE CELLS: ABNORMAL
COLOR UR AUTO: ABNORMAL
EOSINOPHIL # BLD AUTO: 0.1 10E3/UL (ref 0–0.7)
EOSINOPHIL NFR BLD AUTO: 1 %
ERYTHROCYTE [DISTWIDTH] IN BLOOD BY AUTOMATED COUNT: 13.4 % (ref 10–15)
GLUCOSE UR STRIP-MCNC: NEGATIVE MG/DL
HCT VFR BLD AUTO: 33 % (ref 35–47)
HGB BLD-MCNC: 10.9 G/DL (ref 11.7–15.7)
HGB UR QL STRIP: NEGATIVE
HOLD SPECIMEN: NORMAL
IMM GRANULOCYTES # BLD: 0.4 10E3/UL
IMM GRANULOCYTES NFR BLD: 3 %
KETONES UR STRIP-MCNC: NEGATIVE MG/DL
LEUKOCYTE ESTERASE UR QL STRIP: ABNORMAL
LYMPHOCYTES # BLD AUTO: 2 10E3/UL (ref 0.8–5.3)
LYMPHOCYTES NFR BLD AUTO: 13 %
MCH RBC QN AUTO: 28.2 PG (ref 26.5–33)
MCHC RBC AUTO-ENTMCNC: 33 G/DL (ref 31.5–36.5)
MCV RBC AUTO: 85 FL (ref 78–100)
MONOCYTES # BLD AUTO: 1 10E3/UL (ref 0–1.3)
MONOCYTES NFR BLD AUTO: 7 %
MUCOUS THREADS #/AREA URNS LPF: PRESENT /LPF
NEUTROPHILS # BLD AUTO: 11.8 10E3/UL (ref 1.6–8.3)
NEUTROPHILS NFR BLD AUTO: 76 %
NITRATE UR QL: NEGATIVE
NRBC # BLD AUTO: 0 10E3/UL
NRBC BLD AUTO-RTO: 0 /100
PH UR STRIP: 7.5 [PH] (ref 5–7)
PLATELET # BLD AUTO: 251 10E3/UL (ref 150–450)
RBC # BLD AUTO: 3.87 10E6/UL (ref 3.8–5.2)
RBC URINE: <1 /HPF
RUPTURE OF FETAL MEMBRANES BY ROM PLUS: NEGATIVE
SARS-COV-2 RNA RESP QL NAA+PROBE: NEGATIVE
SP GR UR STRIP: 1.02 (ref 1–1.03)
SPECIMEN EXPIRATION DATE: NORMAL
SQUAMOUS EPITHELIAL: <1 /HPF
TRICHOMONAS, WET PREP: ABNORMAL
UROBILINOGEN UR STRIP-MCNC: <2 MG/DL
WBC # BLD AUTO: 15 10E3/UL (ref 4–11)
WBC URINE: 2 /HPF
WBC'S/HIGH POWER FIELD, WET PREP: ABNORMAL
YEAST, WET PREP: ABNORMAL

## 2022-10-20 PROCEDURE — 120N000001 HC R&B MED SURG/OB

## 2022-10-20 PROCEDURE — 87210 SMEAR WET MOUNT SALINE/INK: CPT | Performed by: OBSTETRICS & GYNECOLOGY

## 2022-10-20 PROCEDURE — 85025 COMPLETE CBC W/AUTO DIFF WBC: CPT | Performed by: OBSTETRICS & GYNECOLOGY

## 2022-10-20 PROCEDURE — 96372 THER/PROPH/DIAG INJ SC/IM: CPT | Performed by: OBSTETRICS & GYNECOLOGY

## 2022-10-20 PROCEDURE — 81001 URINALYSIS AUTO W/SCOPE: CPT | Performed by: OBSTETRICS & GYNECOLOGY

## 2022-10-20 PROCEDURE — 86901 BLOOD TYPING SEROLOGIC RH(D): CPT | Performed by: OBSTETRICS & GYNECOLOGY

## 2022-10-20 PROCEDURE — 258N000003 HC RX IP 258 OP 636: Performed by: OBSTETRICS & GYNECOLOGY

## 2022-10-20 PROCEDURE — U0003 INFECTIOUS AGENT DETECTION BY NUCLEIC ACID (DNA OR RNA); SEVERE ACUTE RESPIRATORY SYNDROME CORONAVIRUS 2 (SARS-COV-2) (CORONAVIRUS DISEASE [COVID-19]), AMPLIFIED PROBE TECHNIQUE, MAKING USE OF HIGH THROUGHPUT TECHNOLOGIES AS DESCRIBED BY CMS-2020-01-R: HCPCS | Performed by: OBSTETRICS & GYNECOLOGY

## 2022-10-20 PROCEDURE — 36415 COLL VENOUS BLD VENIPUNCTURE: CPT | Performed by: OBSTETRICS & GYNECOLOGY

## 2022-10-20 PROCEDURE — 87653 STREP B DNA AMP PROBE: CPT | Performed by: OBSTETRICS & GYNECOLOGY

## 2022-10-20 PROCEDURE — 84112 EVAL AMNIOTIC FLUID PROTEIN: CPT | Performed by: OBSTETRICS & GYNECOLOGY

## 2022-10-20 PROCEDURE — 250N000013 HC RX MED GY IP 250 OP 250 PS 637: Performed by: OBSTETRICS & GYNECOLOGY

## 2022-10-20 PROCEDURE — 250N000011 HC RX IP 250 OP 636: Performed by: OBSTETRICS & GYNECOLOGY

## 2022-10-20 PROCEDURE — 86850 RBC ANTIBODY SCREEN: CPT | Performed by: OBSTETRICS & GYNECOLOGY

## 2022-10-20 RX ORDER — AMPICILLIN 2 G/1
2 INJECTION, POWDER, FOR SOLUTION INTRAVENOUS EVERY 6 HOURS
Status: COMPLETED | OUTPATIENT
Start: 2022-10-20 | End: 2022-10-22

## 2022-10-20 RX ORDER — DIPHENHYDRAMINE HYDROCHLORIDE 50 MG/ML
25 INJECTION INTRAMUSCULAR; INTRAVENOUS EVERY 6 HOURS PRN
Status: DISCONTINUED | OUTPATIENT
Start: 2022-10-20 | End: 2022-10-22 | Stop reason: HOSPADM

## 2022-10-20 RX ORDER — CALCIUM GLUCONATE 94 MG/ML
1 INJECTION, SOLUTION INTRAVENOUS
Status: DISCONTINUED | OUTPATIENT
Start: 2022-10-20 | End: 2022-10-22 | Stop reason: HOSPADM

## 2022-10-20 RX ORDER — GABAPENTIN 300 MG/1
300 CAPSULE ORAL
Status: DISCONTINUED | OUTPATIENT
Start: 2022-10-20 | End: 2022-10-22 | Stop reason: HOSPADM

## 2022-10-20 RX ORDER — PROCHLORPERAZINE MALEATE 10 MG
10 TABLET ORAL EVERY 6 HOURS PRN
Status: DISCONTINUED | OUTPATIENT
Start: 2022-10-20 | End: 2022-10-22 | Stop reason: HOSPADM

## 2022-10-20 RX ORDER — ONDANSETRON 4 MG/1
4 TABLET, ORALLY DISINTEGRATING ORAL EVERY 6 HOURS PRN
Status: DISCONTINUED | OUTPATIENT
Start: 2022-10-20 | End: 2022-10-22 | Stop reason: HOSPADM

## 2022-10-20 RX ORDER — GABAPENTIN 300 MG/1
300 CAPSULE ORAL
Status: DISCONTINUED | OUTPATIENT
Start: 2022-10-21 | End: 2022-10-22 | Stop reason: HOSPADM

## 2022-10-20 RX ORDER — GABAPENTIN 300 MG/1
600 CAPSULE ORAL AT BEDTIME
Status: DISCONTINUED | OUTPATIENT
Start: 2022-10-20 | End: 2022-10-22 | Stop reason: HOSPADM

## 2022-10-20 RX ORDER — MULTIVITAMIN WITH IRON
1 TABLET ORAL AT BEDTIME
COMMUNITY

## 2022-10-20 RX ORDER — NITROFURANTOIN 25; 75 MG/1; MG/1
100 CAPSULE ORAL EVERY 12 HOURS SCHEDULED
Status: DISCONTINUED | OUTPATIENT
Start: 2022-10-20 | End: 2022-10-22 | Stop reason: HOSPADM

## 2022-10-20 RX ORDER — SODIUM CHLORIDE, SODIUM LACTATE, POTASSIUM CHLORIDE, CALCIUM CHLORIDE 600; 310; 30; 20 MG/100ML; MG/100ML; MG/100ML; MG/100ML
INJECTION, SOLUTION INTRAVENOUS CONTINUOUS
Status: DISCONTINUED | OUTPATIENT
Start: 2022-10-20 | End: 2022-10-22 | Stop reason: HOSPADM

## 2022-10-20 RX ORDER — AZITHROMYCIN 250 MG/1
1000 TABLET, FILM COATED ORAL ONCE
Status: COMPLETED | OUTPATIENT
Start: 2022-10-20 | End: 2022-10-20

## 2022-10-20 RX ORDER — ZOLPIDEM TARTRATE 5 MG/1
5 TABLET ORAL
Status: DISCONTINUED | OUTPATIENT
Start: 2022-10-20 | End: 2022-10-22 | Stop reason: HOSPADM

## 2022-10-20 RX ORDER — DIPHENHYDRAMINE HCL 25 MG
25 CAPSULE ORAL EVERY 6 HOURS PRN
Status: DISCONTINUED | OUTPATIENT
Start: 2022-10-20 | End: 2022-10-22 | Stop reason: HOSPADM

## 2022-10-20 RX ORDER — ONDANSETRON 2 MG/ML
4 INJECTION INTRAMUSCULAR; INTRAVENOUS EVERY 6 HOURS PRN
Status: DISCONTINUED | OUTPATIENT
Start: 2022-10-20 | End: 2022-10-22 | Stop reason: HOSPADM

## 2022-10-20 RX ORDER — ACETAMINOPHEN 325 MG/1
650 TABLET ORAL EVERY 4 HOURS PRN
Status: DISCONTINUED | OUTPATIENT
Start: 2022-10-20 | End: 2022-10-22 | Stop reason: HOSPADM

## 2022-10-20 RX ORDER — BETAMETHASONE SODIUM PHOSPHATE AND BETAMETHASONE ACETATE 3; 3 MG/ML; MG/ML
12 INJECTION, SUSPENSION INTRA-ARTICULAR; INTRALESIONAL; INTRAMUSCULAR; SOFT TISSUE EVERY 24 HOURS
Status: COMPLETED | OUTPATIENT
Start: 2022-10-20 | End: 2022-10-21

## 2022-10-20 RX ORDER — NICOTINE POLACRILEX 4 MG/1
20 GUM, CHEWING ORAL DAILY
COMMUNITY
End: 2023-02-28

## 2022-10-20 RX ORDER — TERBUTALINE SULFATE 1 MG/ML
0.25 INJECTION, SOLUTION SUBCUTANEOUS ONCE
Status: COMPLETED | OUTPATIENT
Start: 2022-10-20 | End: 2022-10-20

## 2022-10-20 RX ORDER — PANTOPRAZOLE SODIUM 40 MG/1
40 TABLET, DELAYED RELEASE ORAL
Status: DISCONTINUED | OUTPATIENT
Start: 2022-10-20 | End: 2022-10-22 | Stop reason: HOSPADM

## 2022-10-20 RX ORDER — AMOXICILLIN 250 MG/1
250 CAPSULE ORAL 3 TIMES DAILY
Status: DISCONTINUED | OUTPATIENT
Start: 2022-10-22 | End: 2022-10-22 | Stop reason: HOSPADM

## 2022-10-20 RX ORDER — PROCHLORPERAZINE 25 MG
25 SUPPOSITORY, RECTAL RECTAL EVERY 12 HOURS PRN
Status: DISCONTINUED | OUTPATIENT
Start: 2022-10-20 | End: 2022-10-22 | Stop reason: HOSPADM

## 2022-10-20 RX ORDER — MAGNESIUM SULFATE IN WATER 40 MG/ML
1 INJECTION, SOLUTION INTRAVENOUS CONTINUOUS
Status: DISCONTINUED | OUTPATIENT
Start: 2022-10-20 | End: 2022-10-22 | Stop reason: HOSPADM

## 2022-10-20 RX ORDER — METOCLOPRAMIDE 10 MG/1
10 TABLET ORAL EVERY 6 HOURS PRN
Status: DISCONTINUED | OUTPATIENT
Start: 2022-10-20 | End: 2022-10-22 | Stop reason: HOSPADM

## 2022-10-20 RX ORDER — METOCLOPRAMIDE HYDROCHLORIDE 5 MG/ML
10 INJECTION INTRAMUSCULAR; INTRAVENOUS EVERY 6 HOURS PRN
Status: DISCONTINUED | OUTPATIENT
Start: 2022-10-20 | End: 2022-10-22 | Stop reason: HOSPADM

## 2022-10-20 RX ADMIN — AMPICILLIN 2 G: 2 INJECTION, POWDER, FOR SOLUTION INTRAVENOUS at 17:43

## 2022-10-20 RX ADMIN — AMPICILLIN 2 G: 2 INJECTION, POWDER, FOR SOLUTION INTRAVENOUS at 12:01

## 2022-10-20 RX ADMIN — TERBUTALINE SULFATE 0.25 MG: 1 INJECTION, SOLUTION SUBCUTANEOUS at 09:15

## 2022-10-20 RX ADMIN — SODIUM CHLORIDE, POTASSIUM CHLORIDE, SODIUM LACTATE AND CALCIUM CHLORIDE 125 ML/HR: 600; 310; 30; 20 INJECTION, SOLUTION INTRAVENOUS at 12:18

## 2022-10-20 RX ADMIN — GABAPENTIN 600 MG: 300 CAPSULE ORAL at 21:10

## 2022-10-20 RX ADMIN — GABAPENTIN 300 MG: 300 CAPSULE ORAL at 13:22

## 2022-10-20 RX ADMIN — ACETAMINOPHEN 650 MG: 325 TABLET, FILM COATED ORAL at 16:36

## 2022-10-20 RX ADMIN — BETAMETHASONE SODIUM PHOSPHATE AND BETAMETHASONE ACETATE 12 MG: 3; 3 INJECTION, SUSPENSION INTRA-ARTICULAR; INTRALESIONAL; INTRAMUSCULAR at 12:05

## 2022-10-20 RX ADMIN — NITROFURANTOIN MONOHYDRATE/MACROCRYSTALLINE 100 MG: 25; 75 CAPSULE ORAL at 12:14

## 2022-10-20 RX ADMIN — SODIUM CHLORIDE, POTASSIUM CHLORIDE, SODIUM LACTATE AND CALCIUM CHLORIDE 300 ML: 600; 310; 30; 20 INJECTION, SOLUTION INTRAVENOUS at 09:23

## 2022-10-20 RX ADMIN — AZITHROMYCIN MONOHYDRATE 1000 MG: 250 TABLET ORAL at 13:22

## 2022-10-20 RX ADMIN — MAGNESIUM SULFATE HEPTAHYDRATE 1 G/HR: 40 INJECTION, SOLUTION INTRAVENOUS at 13:38

## 2022-10-20 ASSESSMENT — ACTIVITIES OF DAILY LIVING (ADL)
ADLS_ACUITY_SCORE: 18
ADLS_ACUITY_SCORE: 18
CONCENTRATING,_REMEMBERING_OR_MAKING_DECISIONS_DIFFICULTY: NO
DRESSING/BATHING_DIFFICULTY: NO
DOING_ERRANDS_INDEPENDENTLY_DIFFICULTY: NO
ADLS_ACUITY_SCORE: 18
ADLS_ACUITY_SCORE: 31
WALKING_OR_CLIMBING_STAIRS_DIFFICULTY: NO
ADLS_ACUITY_SCORE: 18
ADLS_ACUITY_SCORE: 18
WEAR_GLASSES_OR_BLIND: NO
DIFFICULTY_EATING/SWALLOWING: NO
FALL_HISTORY_WITHIN_LAST_SIX_MONTHS: NO
ADLS_ACUITY_SCORE: 18
ADLS_ACUITY_SCORE: 31
CHANGE_IN_FUNCTIONAL_STATUS_SINCE_ONSET_OF_CURRENT_ILLNESS/INJURY: NO
TOILETING_ISSUES: NO

## 2022-10-21 LAB — GP B STREP DNA SPEC QL NAA+PROBE: POSITIVE

## 2022-10-21 PROCEDURE — 258N000003 HC RX IP 258 OP 636: Performed by: OBSTETRICS & GYNECOLOGY

## 2022-10-21 PROCEDURE — 120N000001 HC R&B MED SURG/OB

## 2022-10-21 PROCEDURE — 250N000013 HC RX MED GY IP 250 OP 250 PS 637: Performed by: OBSTETRICS & GYNECOLOGY

## 2022-10-21 PROCEDURE — 722N000001 HC LABOR CARE VAGINAL DELIVERY SINGLE

## 2022-10-21 PROCEDURE — 250N000011 HC RX IP 250 OP 636: Performed by: OBSTETRICS & GYNECOLOGY

## 2022-10-21 RX ORDER — HYDROXYZINE HYDROCHLORIDE 25 MG/1
25 TABLET, FILM COATED ORAL EVERY 6 HOURS PRN
Status: DISCONTINUED | OUTPATIENT
Start: 2022-10-21 | End: 2022-10-22 | Stop reason: HOSPADM

## 2022-10-21 RX ORDER — HYDROXYZINE HYDROCHLORIDE 50 MG/1
50 TABLET, FILM COATED ORAL EVERY 6 HOURS PRN
Status: DISCONTINUED | OUTPATIENT
Start: 2022-10-21 | End: 2022-10-22 | Stop reason: HOSPADM

## 2022-10-21 RX ADMIN — AMPICILLIN 2 G: 2 INJECTION, POWDER, FOR SOLUTION INTRAVENOUS at 12:32

## 2022-10-21 RX ADMIN — GABAPENTIN 300 MG: 300 CAPSULE ORAL at 15:41

## 2022-10-21 RX ADMIN — ACETAMINOPHEN 650 MG: 325 TABLET, FILM COATED ORAL at 06:18

## 2022-10-21 RX ADMIN — BETAMETHASONE SODIUM PHOSPHATE AND BETAMETHASONE ACETATE 12 MG: 3; 3 INJECTION, SUSPENSION INTRA-ARTICULAR; INTRALESIONAL; INTRAMUSCULAR at 12:29

## 2022-10-21 RX ADMIN — AMPICILLIN 2 G: 2 INJECTION, POWDER, FOR SOLUTION INTRAVENOUS at 05:47

## 2022-10-21 RX ADMIN — HYDROXYZINE HYDROCHLORIDE 50 MG: 50 TABLET ORAL at 00:41

## 2022-10-21 RX ADMIN — NITROFURANTOIN MONOHYDRATE/MACROCRYSTALLINE 100 MG: 25; 75 CAPSULE ORAL at 22:23

## 2022-10-21 RX ADMIN — GABAPENTIN 600 MG: 300 CAPSULE ORAL at 19:58

## 2022-10-21 RX ADMIN — SODIUM CHLORIDE, POTASSIUM CHLORIDE, SODIUM LACTATE AND CALCIUM CHLORIDE: 600; 310; 30; 20 INJECTION, SOLUTION INTRAVENOUS at 05:00

## 2022-10-21 RX ADMIN — GABAPENTIN 300 MG: 300 CAPSULE ORAL at 09:23

## 2022-10-21 RX ADMIN — HYDROXYZINE HYDROCHLORIDE 50 MG: 50 TABLET ORAL at 19:58

## 2022-10-21 RX ADMIN — AMPICILLIN 2 G: 2 INJECTION, POWDER, FOR SOLUTION INTRAVENOUS at 18:44

## 2022-10-21 RX ADMIN — AMPICILLIN 2 G: 2 INJECTION, POWDER, FOR SOLUTION INTRAVENOUS at 00:06

## 2022-10-21 RX ADMIN — NITROFURANTOIN MONOHYDRATE/MACROCRYSTALLINE 100 MG: 25; 75 CAPSULE ORAL at 00:07

## 2022-10-21 ASSESSMENT — ACTIVITIES OF DAILY LIVING (ADL)
ADLS_ACUITY_SCORE: 18

## 2022-10-21 NOTE — PLAN OF CARE
"2320 - Assumed care of 30 yo, , 33+4 WGA.  Admitted am of 10/20/22 yeimi.  History of PTL and PT birth & active UTI (per RN report).  At this time pt is up in BR, frequently voiding and verbalizing frustration \"with cord, IV, urine frequency\" and her anticipated PT delivery.    Problem: Plan of Care - These are the overarching goals to be used throughout the patient stay.    Goal: Plan of Care Review  Description: The Plan of Care Review/Shift note should be completed every shift.  The Outcome Evaluation is a brief statement about your assessment that the patient is improving, declining, or no change.  This information will be displayed automatically on your shift note.  Outcome: Progressing  Discussed rationale for current medical intervention, MgSo4, antibiotics and administration of BMZ.  Pt states that she \"has been through this before\" (PTL and birth) and is not \"excited\" about what will be happening.  Provided emotional support, a listening ear and reassurance that the POC is intentional to extend pregnancy, fetal well being and support maternal physiological stability.  Goal: Patient-Specific Goal (Individualized)  Description: You can add care plan individualizations to a care plan. Examples of Individualization might be:  \"Parent requests to be called daily at 9am for status\", \"I have a hard time hearing out of my right ear\", or \"Do not touch me to wake me up as it startles me\".  Outcome: Progressing  Desires to sleep, reduce frequency to BR, stay pregnant and reduce infants length of stay in the NICU.  Affirmed pt in her decision to accept treatment, continue cares and embrace support.  Goal: Absence of Hospital-Acquired Illness or Injury  Intervention: Prevent Skin Injury  Recent Flowsheet Documentation  Taken 10/21/2022 0014 by Karon De La Paz, RN  Body Position: position changed independently       Assessment is WNL.  Goal: Optimal Comfort and Wellbeing  Outcome: Progressing  Intervention: " Provide Person-Centered Care  Recent Flowsheet Documentation  Taken 10/21/2022 001 by Karon De La Paz, RN  Trust Relationship/Rapport:   care explained   choices provided   emotional support provided   empathic listening provided   questions answered   questions encouraged   reassurance provided   thoughts/feelings acknowledged     Problem:  Labor  Goal: Delayed  Delivery  Outcome: Progressing  Intervention: Monitor and Manage  Labor  Recent Flowsheet Documentation  Taken 10/21/2022 0014 by Karon De La Paz, RN  Body Position: position changed independently    States she is having more frequent uterine activity. EMX2 reapplied. Uterine contraction noted and palpated mod.  FHR Cat 1.    Dr Ortiz updated on pt frustrations, need for H&P and POC in pt chart.  Atarax 50mg/100mg order for sleep assist.

## 2022-10-21 NOTE — PLAN OF CARE
Patient came into hospital at 33w3 with c/o yeimi every 6 minutes and here she is yeimi every 8 minutes.  She came in feeling like she is peeing on self.  I spoke to Dr. Gonzalez regarding his patient.  Orders were placed and I checked her cervix she was 3/40/-2 at 0852.  We moved her to room 11 to be admitted.

## 2022-10-21 NOTE — PROGRESS NOTES
Eden is doing well and tolerating the magnesium.  VSS, lungs CTA bilaterally, reflexes +2/+2 with no clonus.  No s/s of mag toxicity except a mild headache which improved after tylenol.  She has been outside to vape two times and disconnected from the magnesium at that time.  Category one tracing present and mild uterine irritability with occasional contractions.

## 2022-10-21 NOTE — PROGRESS NOTES
0630 - Eden requesting Tylenol for frontal headache, rating 6/10.  Writer offered cool compress in addition, pt did not desire.

## 2022-10-21 NOTE — H&P
"OB ANTEPARTUM HISTORY AND PHYSICAL    IUP at 33w4d, admitted for  labor     SUBJECTIVE:  Patient feels well. She has no new complaints. Patient states the baby is active, and is completing fetal kick counts. States that she is experiencing occasional contractions.  Patient denies headache, change in vision or upper abdominal pain. She denies vaginal bleeding. denies leaking of fluids. denies change in discharge.     OBJECTIVE:  /53   Pulse 96   Temp 97.8  F (36.6  C) (Oral)   Resp 16   Ht 1.626 m (5' 4\")   Wt 94.3 kg (207 lb 12.8 oz)   LMP 2022 (Approximate)   SpO2 96%   BMI 35.67 kg/m     Abd: gravid  Cx: 3cm/70/-1  NST: Reactive  TOCO: Occasional CTX    LABS:  Normal    OB LABS:   @LABRSLTOB(ABORH EXT,LN-ABORH,HML ABO/RH,HGB EXT,HGB,RUBELLA EXT,LN-RUBELLA IGG ANTIBODY:Last:1)@     LABS: GBS Pending    MEDICATIONS:    Current Facility-Administered Medications:      acetaminophen (TYLENOL) tablet 650 mg, 650 mg, Oral, Q4H PRN, Paul Gonzalez MD, 650 mg at 10/21/22 0618     [START ON 10/22/2022] amoxicillin (AMOXIL) capsule 250 mg, 250 mg, Oral, TID, Paul Gonzalez MD     ampicillin (OMNIPEN) 2 g vial to attach to  mL bag, 2 g, Intravenous, Q6H, Paul Gonzalez MD, 2 g at 10/21/22 0547     betamethasone acet & sod phos (CELESTONE) injection 12 mg, 12 mg, Intramuscular, Q24H, Paul Gonzalez MD, 12 mg at 10/20/22 1205     calcium gluconate 10 % injection 1 g, 1 g, Intravenous, Once PRN, Paul Gonzalez MD     diphenhydrAMINE (BENADRYL) capsule 25 mg, 25 mg, Oral, Q6H PRN **OR** diphenhydrAMINE (BENADRYL) injection 25 mg, 25 mg, Intravenous, Q6H PRN, Paul Gonzalez MD     gabapentin (NEURONTIN) capsule 300 mg, 300 mg, Oral, Daily with breakfast, Paul Gonzalez MD     gabapentin (NEURONTIN) capsule 300 mg, 300 mg, Oral, Daily with lunch, Paul Gonzalez MD, 300 mg at 10/20/22 1322     gabapentin " (NEURONTIN) capsule 600 mg, 600 mg, Oral, At Bedtime, Paul Gonzalez MD, 600 mg at 10/20/22 2110     hydrOXYzine (ATARAX) tablet 25 mg, 25 mg, Oral, Q6H PRN **OR** hydrOXYzine (ATARAX) tablet 50 mg, 50 mg, Oral, Q6H PRN, Maria De Jesus Ortiz MD, 50 mg at 10/21/22 0041     lactated ringers infusion, , Intravenous, Continuous, Paul Gonzalez MD, Last Rate: 125 mL/hr at 10/21/22 0500, New Bag at 10/21/22 0500     magnesium sulfate infusion, 1 g/hr, Intravenous, Continuous, Paul Gonzalez MD, Last Rate: 25 mL/hr at 10/21/22 0014, 1 g/hr at 10/21/22 0014     metoclopramide (REGLAN) injection 10 mg, 10 mg, Intravenous, Q6H PRN **OR** metoclopramide (REGLAN) tablet 10 mg, 10 mg, Oral, Q6H PRN, Paul Gonzalez MD     nitroFURantoin macrocrystal-monohydrate (MACROBID) capsule 100 mg, 100 mg, Oral, Q12H CINTIA (), Paul Gonzalez MD, 100 mg at 10/21/22 0007     No Tdap Needed - Assessment: Patient does not need Tdap vaccine, , Does not apply, Continuous PRN, Paul Gonzalez MD     ondansetron (ZOFRAN ODT) ODT tab 4 mg, 4 mg, Oral, Q6H PRN **OR** ondansetron (ZOFRAN) injection 4 mg, 4 mg, Intravenous, Q6H PRN, Paul Gnozalez MD     pantoprazole (PROTONIX) EC tablet 40 mg, 40 mg, Oral, QAM AC, Paul Gonzalez MD     prochlorperazine (COMPAZINE) injection 10 mg, 10 mg, Intravenous, Q6H PRN **OR** prochlorperazine (COMPAZINE) tablet 10 mg, 10 mg, Oral, Q6H PRN **OR** prochlorperazine (COMPAZINE) suppository 25 mg, 25 mg, Rectal, Q12H PRN, Paul Gonzalez MD     zolpidem (AMBIEN) tablet 5 mg, 5 mg, Oral, At Bedtime PRN, Paul Gonzalez MD    ASSESSMENT:  31 year old @GP@ at 33w4d    labor     PLAN:  - admit to hospital   -Steroids, Mag,Nifedipine  Reassess 24 hours post steroids    Paul Gonzalez MD

## 2022-10-22 VITALS
HEART RATE: 96 BPM | OXYGEN SATURATION: 97 % | TEMPERATURE: 97.8 F | DIASTOLIC BLOOD PRESSURE: 77 MMHG | BODY MASS INDEX: 36.07 KG/M2 | HEIGHT: 64 IN | RESPIRATION RATE: 18 BRPM | WEIGHT: 211.3 LBS | SYSTOLIC BLOOD PRESSURE: 125 MMHG

## 2022-10-22 PROCEDURE — 250N000011 HC RX IP 250 OP 636: Performed by: OBSTETRICS & GYNECOLOGY

## 2022-10-22 PROCEDURE — 250N000013 HC RX MED GY IP 250 OP 250 PS 637: Performed by: OBSTETRICS & GYNECOLOGY

## 2022-10-22 RX ORDER — NITROFURANTOIN 25; 75 MG/1; MG/1
100 CAPSULE ORAL 2 TIMES DAILY
Qty: 12 CAPSULE | Refills: 0 | Status: SHIPPED | OUTPATIENT
Start: 2022-10-22 | End: 2022-10-28

## 2022-10-22 RX ADMIN — PANTOPRAZOLE SODIUM 40 MG: 40 TABLET, DELAYED RELEASE ORAL at 07:49

## 2022-10-22 RX ADMIN — GABAPENTIN 300 MG: 300 CAPSULE ORAL at 08:40

## 2022-10-22 RX ADMIN — AMPICILLIN 2 G: 2 INJECTION, POWDER, FOR SOLUTION INTRAVENOUS at 00:29

## 2022-10-22 RX ADMIN — AMPICILLIN 2 G: 2 INJECTION, POWDER, FOR SOLUTION INTRAVENOUS at 06:42

## 2022-10-22 ASSESSMENT — ACTIVITIES OF DAILY LIVING (ADL)
ADLS_ACUITY_SCORE: 18

## 2022-10-22 NOTE — PROGRESS NOTES
"Pt took off her fetal monitor stating \" I do not want to be connected to anything, I just want to sleep and go home in the morning\" and I do not want anybody coming into my room\" .  "

## 2022-10-22 NOTE — PROGRESS NOTES
"OB ANTEPARTUM PROGRESS NOTE    IUP at 33w5d, admitted for  labor    SUBJECTIVE:  Patient feels well and is ready to go home.     OBJECTIVE:  /77 (BP Location: Right arm, Patient Position: Left side, Cuff Size: Adult Regular)   Pulse 96   Temp 97.8  F (36.6  C) (Oral)   Resp 18   Ht 1.626 m (5' 4\")   Wt 95.8 kg (211 lb 4.8 oz)   LMP 2022 (Approximate)   SpO2 97%   BMI 36.27 kg/m     Abd: gravid  NST: Category I    ASSESSMENT:  31 year old  at 33w5d admitted with  labor  s/p betamethasone    PLAN:  Discharge home  Close follow-up   PTL precautions    Michael Baltazar M.D.  "

## 2022-10-22 NOTE — PROGRESS NOTES
Discussed all discharge instructions with patient including signs of  labor and signs of preeclampsia. Patient acknowledged understanding of discharge instructions.

## 2022-10-25 ENCOUNTER — HOSPITAL ENCOUNTER (OUTPATIENT)
Facility: HOSPITAL | Age: 32
Discharge: HOME | End: 2022-10-25
Attending: OBSTETRICS & GYNECOLOGY | Admitting: OBSTETRICS & GYNECOLOGY
Payer: COMMERCIAL

## 2022-10-25 ENCOUNTER — PATIENT OUTREACH (OUTPATIENT)
Dept: CARE COORDINATION | Facility: CLINIC | Age: 32
End: 2022-10-25

## 2022-10-25 VITALS — TEMPERATURE: 99.9 F

## 2022-10-25 PROCEDURE — 999N000127 HC STATISTIC PERIPHERAL IV START W US GUIDANCE

## 2022-10-25 PROCEDURE — 250N000011 HC RX IP 250 OP 636: Performed by: OBSTETRICS & GYNECOLOGY

## 2022-10-25 PROCEDURE — G0463 HOSPITAL OUTPT CLINIC VISIT: HCPCS

## 2022-10-25 PROCEDURE — 258N000003 HC RX IP 258 OP 636: Performed by: OBSTETRICS & GYNECOLOGY

## 2022-10-25 RX ORDER — TERBUTALINE SULFATE 1 MG/ML
0.25 INJECTION, SOLUTION SUBCUTANEOUS ONCE
Status: DISCONTINUED | OUTPATIENT
Start: 2022-10-25 | End: 2022-10-25 | Stop reason: HOSPADM

## 2022-10-25 RX ORDER — ONDANSETRON 2 MG/ML
4 INJECTION INTRAMUSCULAR; INTRAVENOUS ONCE
Status: COMPLETED | OUTPATIENT
Start: 2022-10-25 | End: 2022-10-25

## 2022-10-25 RX ORDER — LIDOCAINE 40 MG/G
CREAM TOPICAL
Status: DISCONTINUED | OUTPATIENT
Start: 2022-10-25 | End: 2022-10-25 | Stop reason: HOSPADM

## 2022-10-25 RX ADMIN — SODIUM CHLORIDE, POTASSIUM CHLORIDE, SODIUM LACTATE AND CALCIUM CHLORIDE 1000 ML: 600; 310; 30; 20 INJECTION, SOLUTION INTRAVENOUS at 11:57

## 2022-10-25 RX ADMIN — ONDANSETRON 4 MG: 2 INJECTION INTRAMUSCULAR; INTRAVENOUS at 11:55

## 2022-10-25 ASSESSMENT — ACTIVITIES OF DAILY LIVING (ADL)
ADLS_ACUITY_SCORE: 35
ADLS_ACUITY_SCORE: 35

## 2022-10-25 NOTE — PROGRESS NOTES
Pt presented to [facility] c/o nausea and vomiting since 0400. Pt has not vomited since she has been here. Attempted IV placement was unsuccessful PICC team paged.  Performed a cervical exam cervix is 3.5/70/-1. Pt said cervix was 3 cm on Saturday.    Informed Dr. Gonzalez of status and SVE, orders for Terb x1.

## 2022-10-25 NOTE — PROGRESS NOTES
Clinic Care Coordination Contact  Care Team Conversations    KERRIE RAMIREZ performed chart review and noted the pt's PCP is aware of the recent hospitalization. KERRIE RAMIREZ will not be outreaching to the pt at this time.     Melba Parker [facility]  Social Work Care Coordinator - [facility]  Care Coordination  [email or web address]  Cell Phone: [phone]  Gender pronouns: she/her  Employed by [employer]

## 2022-10-25 NOTE — PROGRESS NOTES
"Pt refused Terbutaline says \" I don't want to stop it, I'm tired of this\".    Agreed to getting fluids, waiting for th [facility] team now.  "

## 2022-10-25 NOTE — DISCHARGE INSTRUCTIONS
Discharge Instruction for Undelivered Patients      You were seen for:  Nausea and Vomiting  We Consulted: Dr. Gonzalez  You had (Test or Medicine):N/A     Diet:   Drink 8 to 12 glasses of liquids (milk, juice, water) every day.     Activity:  Call your doctor or nurse midwife if your baby is moving less than usual.     Call your provider if you notice:  Swelling in your face or increased swelling in your hands or legs.  Headaches that are not relieved by Tylenol (acetaminophen).  Changes in your vision (blurring: seeing spots or stars.)  Nausea (sick to your stomach) and vomiting (throwing up).   Weight gain of 5 pounds or more per week.  Heartburn that doesn't go away.  Signs of bladder infection: pain when you urinate (use the toilet), need to go more often and more urgently.  The bag of newman (rupture of membranes) breaks, or you notice leaking in your underwear.  Bright red blood in your underwear.  Abdominal (lower belly) or stomach pain.  For first baby: Contractions (tightening) less than 5 minutes apart for one hour or more.  Second (plus) baby: Contractions (tightening) less than 10 minutes apart and getting stronger.  *If less than 34 weeks: Contractions (tightening) more than 6 times in one hour.  Increase or change in vaginal discharge (note the color and amount)  Other:Call the clinic tomorrow if things don't improve.  Follow-up:  As scheduled in the clinic

## 2022-10-25 NOTE — PROGRESS NOTES
Pt states ctns are better, she still has nausea and now had one episode diarrhea. Updated Dr. Gonzalez, he gave orders to discharge home.

## 2022-11-01 ENCOUNTER — HOSPITAL ENCOUNTER (OUTPATIENT)
Facility: HOSPITAL | Age: 32
Discharge: HOME | End: 2022-11-01
Attending: OBSTETRICS & GYNECOLOGY | Admitting: OBSTETRICS & GYNECOLOGY
Payer: COMMERCIAL

## 2022-11-01 LAB
ALBUMIN UR-MCNC: 20 MG/DL
APPEARANCE UR: CLEAR
BILIRUB UR QL STRIP: NEGATIVE
CLUE CELLS: ABNORMAL
COLOR UR AUTO: YELLOW
GLUCOSE UR STRIP-MCNC: NEGATIVE MG/DL
HGB UR QL STRIP: NEGATIVE
KETONES UR STRIP-MCNC: ABNORMAL MG/DL
LEUKOCYTE ESTERASE UR QL STRIP: NEGATIVE
MUCOUS THREADS #/AREA URNS LPF: PRESENT /LPF
NITRATE UR QL: NEGATIVE
PH UR STRIP: 5.5 [PH] (ref 5–7)
RBC URINE: <1 /HPF
RUPTURE OF FETAL MEMBRANES BY ROM PLUS: NEGATIVE
SP GR UR STRIP: 1.03 (ref 1–1.03)
SQUAMOUS EPITHELIAL: 1 /HPF
TRANSITIONAL EPI: <1 /HPF
TRICHOMONAS, WET PREP: ABNORMAL
UROBILINOGEN UR STRIP-MCNC: <2 MG/DL
WBC URINE: 1 /HPF
WBC'S/HIGH POWER FIELD, WET PREP: ABNORMAL
YEAST, WET PREP: ABNORMAL

## 2022-11-01 PROCEDURE — 87210 SMEAR WET MOUNT SALINE/INK: CPT | Performed by: OBSTETRICS & GYNECOLOGY

## 2022-11-01 PROCEDURE — 81001 URINALYSIS AUTO W/SCOPE: CPT | Performed by: OBSTETRICS & GYNECOLOGY

## 2022-11-01 PROCEDURE — G0463 HOSPITAL OUTPT CLINIC VISIT: HCPCS

## 2022-11-01 PROCEDURE — 84112 EVAL AMNIOTIC FLUID PROTEIN: CPT | Performed by: OBSTETRICS & GYNECOLOGY

## 2022-11-01 RX ORDER — LIDOCAINE 40 MG/G
CREAM TOPICAL
Status: DISCONTINUED | OUTPATIENT
Start: 2022-11-01 | End: 2022-11-02 | Stop reason: HOSPADM

## 2022-11-01 ASSESSMENT — ACTIVITIES OF DAILY LIVING (ADL)
ADLS_ACUITY_SCORE: 35
ADLS_ACUITY_SCORE: 35

## 2022-11-02 VITALS — TEMPERATURE: 97.9 F | DIASTOLIC BLOOD PRESSURE: 76 MMHG | RESPIRATION RATE: 16 BRPM | SYSTOLIC BLOOD PRESSURE: 135 MMHG

## 2022-11-02 NOTE — DISCHARGE INSTRUCTIONS
Discharge Instruction for Undelivered Patients      You were seen for: Bleeding Assessment  We Consulted: Dr. ERICH Carrero   You had (Test or Medicine):Urinalysis, wet prep, ROM plus    Diet:   Drink 8 to 12 glasses of liquids (milk, juice, water) every day.  You may eat meals and snacks.     Activity:  Stay on bed rest and pelvic rest until discontinued by your doctor.     Call your provider if you notice:  Swelling in your face or increased swelling in your hands or legs.  Headaches that are not relieved by Tylenol (acetaminophen).  Changes in your vision (blurring: seeing spots or stars.)  Nausea (sick to your stomach) and vomiting (throwing up).   Weight gain of 5 pounds or more per week.  Heartburn that doesn't go away.  Signs of bladder infection: pain when you urinate (use the toilet), need to go more often and more urgently.  The bag of newman (rupture of membranes) breaks, or you notice leaking in your underwear.  Bright red blood in your underwear.  Abdominal (lower belly) or stomach pain.    Second (plus) baby: Contractions (tightening) less than 10 minutes apart and getting stronger.  *If less than 34 weeks: Contractions (tightening) more than 6 times in one hour.  Increase or change in vaginal discharge (note the color and amount)      Follow-up:  As scheduled in the clinic

## 2022-11-02 NOTE — PROGRESS NOTES
Dr. Carrero updated on patient status and results, plan to discharge. Has appt with Dr. Gonzalez on Thursday. Discharged home.

## 2022-11-02 NOTE — PROGRESS NOTES
Eden Castro presents to [facility] at  with intermittent vaginal bleeding since Saturday. She spoke to Dr. Gonzalez yesterday about her bleeding and was advised to come to the hospital for assessment, however [facility] was on divert and she did not want to go to [place]. She noticed brown discharge tonight and came in for assesment.  at 35w1d.    Complications/considerations for this pregnancy:  labor at 33 weeks. Received magnesium and BMZ, 3 cm, discharged after 2-3 days.   Patient reports + vaginal bleeding, questionable LOF, + fetal movement.   Fetal tracing category 1 with accelerations, contractions rare, palpate mild  SVE: 3.5/70/+1  UA, wet prep, and ROM plus collected. No bleeding noted.

## 2022-11-02 NOTE — PROGRESS NOTES
Triage Note    32 yo  at 35w 1d presented to triage with spotting and contractions. She reports brown to bright red spotting with wiping. She was admitted 10/20-10/22 and was given magnesium sulfate and steroids. + FM.     Temp:  [97.9  F (36.6  C)] 97.9  F (36.6  C)  Resp:  [16] 16  BP: (135)/(76) 135/76    NAD, AAO x 3  Abdomen soft, non tender  Cervix: 3/70/+1    FHTs: Cat I  [place]: occasional contractions    Wet prep: Neg  Rom + negative    A/P: 32 yo  at 35w 1d presented with spotting  -- Cervix unchanged from clinic  -- NST reactive  -- Not in labor  -- S/p steroids. Ok to discharge home. Keep scheduled office visit.     Georgina Carrero MD, FACOG  (P) [phone]

## 2022-11-10 ENCOUNTER — HOSPITAL ENCOUNTER (OUTPATIENT)
Facility: HOSPITAL | Age: 32
Discharge: HOME | End: 2022-11-10
Attending: OBSTETRICS & GYNECOLOGY | Admitting: OBSTETRICS & GYNECOLOGY
Payer: COMMERCIAL

## 2022-11-10 VITALS — DIASTOLIC BLOOD PRESSURE: 78 MMHG | SYSTOLIC BLOOD PRESSURE: 132 MMHG | TEMPERATURE: 98 F | RESPIRATION RATE: 16 BRPM

## 2022-11-10 LAB — RUPTURE OF FETAL MEMBRANES BY ROM PLUS: NEGATIVE

## 2022-11-10 PROCEDURE — 84112 EVAL AMNIOTIC FLUID PROTEIN: CPT | Performed by: OBSTETRICS & GYNECOLOGY

## 2022-11-10 PROCEDURE — G0463 HOSPITAL OUTPT CLINIC VISIT: HCPCS

## 2022-11-10 RX ORDER — LIDOCAINE 40 MG/G
CREAM TOPICAL
Status: DISCONTINUED | OUTPATIENT
Start: 2022-11-10 | End: 2022-11-10 | Stop reason: HOSPADM

## 2022-11-10 ASSESSMENT — ACTIVITIES OF DAILY LIVING (ADL): ADLS_ACUITY_SCORE: 31

## 2022-11-10 NOTE — PROGRESS NOTES
Pt here complaining of vaginal pressure and irregular contractions. She also says her discharge has increased. ROM plus swab done. SVE 4-5/70/-1.

## 2022-11-10 NOTE — PROGRESS NOTES
ROM+ negative, contractions every 15-19 minutes. Dr. Gonzalez notified. OK to discontinue home and follow up with any change in labor symptoms, decreased fetal movement, or bleeding. discontinue instructions reviewed and acknowledged by pt.

## 2022-11-15 ENCOUNTER — HOSPITAL ENCOUNTER (INPATIENT)
Facility: HOSPITAL | Age: 32
LOS: 2 days | Discharge: HOME | End: 2022-11-17
Attending: OBSTETRICS & GYNECOLOGY | Admitting: OBSTETRICS & GYNECOLOGY
Payer: COMMERCIAL

## 2022-11-15 ENCOUNTER — ANESTHESIA EVENT (OUTPATIENT)
Dept: OBGYN | Facility: HOSPITAL | Age: 32
End: 2022-11-15
Payer: COMMERCIAL

## 2022-11-15 ENCOUNTER — ANESTHESIA (OUTPATIENT)
Dept: OBGYN | Facility: HOSPITAL | Age: 32
End: 2022-11-15
Payer: COMMERCIAL

## 2022-11-15 DIAGNOSIS — Z3A.39 39 WEEKS GESTATION OF PREGNANCY: Primary | ICD-10-CM

## 2022-11-15 PROBLEM — Z34.90 PREGNANCY: Status: ACTIVE | Noted: 2022-11-15

## 2022-11-15 LAB
ABO/RH(D): NORMAL
ANTIBODY SCREEN: NEGATIVE
HGB BLD-MCNC: 10.8 G/DL (ref 11.7–15.7)
HOLD SPECIMEN: NORMAL
SARS-COV-2 RNA RESP QL NAA+PROBE: NEGATIVE
SPECIMEN EXPIRATION DATE: NORMAL
T PALLIDUM AB SER QL: NONREACTIVE

## 2022-11-15 PROCEDURE — 86901 BLOOD TYPING SEROLOGIC RH(D): CPT | Performed by: OBSTETRICS & GYNECOLOGY

## 2022-11-15 PROCEDURE — 120N000001 HC R&B MED SURG/OB

## 2022-11-15 PROCEDURE — 3E033VJ INTRODUCTION OF OTHER HORMONE INTO PERIPHERAL VEIN, PERCUTANEOUS APPROACH: ICD-10-PCS | Performed by: OBSTETRICS & GYNECOLOGY

## 2022-11-15 PROCEDURE — 36415 COLL VENOUS BLD VENIPUNCTURE: CPT | Performed by: OBSTETRICS & GYNECOLOGY

## 2022-11-15 PROCEDURE — 370N000003 HC ANESTHESIA WARD SERVICE

## 2022-11-15 PROCEDURE — 86780 TREPONEMA PALLIDUM: CPT | Performed by: OBSTETRICS & GYNECOLOGY

## 2022-11-15 PROCEDURE — 250N000011 HC RX IP 250 OP 636: Performed by: ANESTHESIOLOGY

## 2022-11-15 PROCEDURE — 250N000011 HC RX IP 250 OP 636: Performed by: OBSTETRICS & GYNECOLOGY

## 2022-11-15 PROCEDURE — 250N000013 HC RX MED GY IP 250 OP 250 PS 637: Performed by: OBSTETRICS & GYNECOLOGY

## 2022-11-15 PROCEDURE — U0005 INFEC AGEN DETEC AMPLI PROBE: HCPCS | Performed by: OBSTETRICS & GYNECOLOGY

## 2022-11-15 PROCEDURE — 250N000009 HC RX 250: Performed by: OBSTETRICS & GYNECOLOGY

## 2022-11-15 PROCEDURE — 10907ZC DRAINAGE OF AMNIOTIC FLUID, THERAPEUTIC FROM PRODUCTS OF CONCEPTION, VIA NATURAL OR ARTIFICIAL OPENING: ICD-10-PCS | Performed by: OBSTETRICS & GYNECOLOGY

## 2022-11-15 PROCEDURE — 86850 RBC ANTIBODY SCREEN: CPT | Performed by: OBSTETRICS & GYNECOLOGY

## 2022-11-15 PROCEDURE — 250N000009 HC RX 250: Performed by: ANESTHESIOLOGY

## 2022-11-15 PROCEDURE — 00HU33Z INSERTION OF INFUSION DEVICE INTO SPINAL CANAL, PERCUTANEOUS APPROACH: ICD-10-PCS | Performed by: ANESTHESIOLOGY

## 2022-11-15 PROCEDURE — 3E0R3BZ INTRODUCTION OF ANESTHETIC AGENT INTO SPINAL CANAL, PERCUTANEOUS APPROACH: ICD-10-PCS | Performed by: ANESTHESIOLOGY

## 2022-11-15 PROCEDURE — 85018 HEMOGLOBIN: CPT | Performed by: OBSTETRICS & GYNECOLOGY

## 2022-11-15 PROCEDURE — 258N000003 HC RX IP 258 OP 636: Performed by: OBSTETRICS & GYNECOLOGY

## 2022-11-15 PROCEDURE — 722N000001 HC LABOR CARE VAGINAL DELIVERY SINGLE

## 2022-11-15 RX ORDER — OXYTOCIN 10 [USP'U]/ML
10 INJECTION, SOLUTION INTRAMUSCULAR; INTRAVENOUS
Status: DISCONTINUED | OUTPATIENT
Start: 2022-11-15 | End: 2022-11-15 | Stop reason: HOSPADM

## 2022-11-15 RX ORDER — CARBOPROST TROMETHAMINE 250 UG/ML
250 INJECTION, SOLUTION INTRAMUSCULAR
Status: DISCONTINUED | OUTPATIENT
Start: 2022-11-15 | End: 2022-11-17 | Stop reason: HOSPADM

## 2022-11-15 RX ORDER — PENICILLIN G POTASSIUM 5000000 [IU]/1
5 INJECTION, POWDER, FOR SOLUTION INTRAMUSCULAR; INTRAVENOUS ONCE
Status: COMPLETED | OUTPATIENT
Start: 2022-11-15 | End: 2022-11-15

## 2022-11-15 RX ORDER — PROCHLORPERAZINE MALEATE 10 MG
10 TABLET ORAL EVERY 6 HOURS PRN
Status: DISCONTINUED | OUTPATIENT
Start: 2022-11-15 | End: 2022-11-15 | Stop reason: HOSPADM

## 2022-11-15 RX ORDER — BUPIVACAINE HYDROCHLORIDE 2.5 MG/ML
INJECTION, SOLUTION EPIDURAL; INFILTRATION; INTRACAUDAL
Status: COMPLETED | OUTPATIENT
Start: 2022-11-15 | End: 2022-11-15

## 2022-11-15 RX ORDER — EPHEDRINE SULFATE 50 MG/ML
5 INJECTION, SOLUTION INTRAMUSCULAR; INTRAVENOUS; SUBCUTANEOUS
Status: DISCONTINUED | OUTPATIENT
Start: 2022-11-15 | End: 2022-11-15 | Stop reason: HOSPADM

## 2022-11-15 RX ORDER — DOCUSATE SODIUM 100 MG/1
100 CAPSULE, LIQUID FILLED ORAL DAILY
Status: DISCONTINUED | OUTPATIENT
Start: 2022-11-15 | End: 2022-11-17 | Stop reason: HOSPADM

## 2022-11-15 RX ORDER — METOCLOPRAMIDE HYDROCHLORIDE 5 MG/ML
10 INJECTION INTRAMUSCULAR; INTRAVENOUS EVERY 6 HOURS PRN
Status: DISCONTINUED | OUTPATIENT
Start: 2022-11-15 | End: 2022-11-15 | Stop reason: HOSPADM

## 2022-11-15 RX ORDER — MISOPROSTOL 200 UG/1
800 TABLET ORAL
Status: DISCONTINUED | OUTPATIENT
Start: 2022-11-15 | End: 2022-11-15 | Stop reason: HOSPADM

## 2022-11-15 RX ORDER — NALBUPHINE HYDROCHLORIDE 10 MG/ML
2.5-5 INJECTION, SOLUTION INTRAMUSCULAR; INTRAVENOUS; SUBCUTANEOUS EVERY 6 HOURS PRN
Status: DISCONTINUED | OUTPATIENT
Start: 2022-11-15 | End: 2022-11-17 | Stop reason: HOSPADM

## 2022-11-15 RX ORDER — GABAPENTIN 300 MG/1
300 CAPSULE ORAL EVERY MORNING
Status: DISCONTINUED | OUTPATIENT
Start: 2022-11-16 | End: 2022-11-17 | Stop reason: HOSPADM

## 2022-11-15 RX ORDER — METHYLERGONOVINE MALEATE 0.2 MG/ML
200 INJECTION INTRAVENOUS
Status: DISCONTINUED | OUTPATIENT
Start: 2022-11-15 | End: 2022-11-15 | Stop reason: HOSPADM

## 2022-11-15 RX ORDER — FENTANYL/ROPIVACAINE/NS/PF 2MCG/ML-.1
PLASTIC BAG, INJECTION (ML) EPIDURAL
Status: DISCONTINUED | OUTPATIENT
Start: 2022-11-15 | End: 2022-11-15 | Stop reason: HOSPADM

## 2022-11-15 RX ORDER — OXYTOCIN/0.9 % SODIUM CHLORIDE 30/500 ML
340 PLASTIC BAG, INJECTION (ML) INTRAVENOUS CONTINUOUS PRN
Status: DISCONTINUED | OUTPATIENT
Start: 2022-11-15 | End: 2022-11-15 | Stop reason: HOSPADM

## 2022-11-15 RX ORDER — GABAPENTIN 300 MG/1
300 CAPSULE ORAL
Status: DISCONTINUED | OUTPATIENT
Start: 2022-11-16 | End: 2022-11-17 | Stop reason: HOSPADM

## 2022-11-15 RX ORDER — NALOXONE HYDROCHLORIDE 0.4 MG/ML
0.4 INJECTION, SOLUTION INTRAMUSCULAR; INTRAVENOUS; SUBCUTANEOUS
Status: DISCONTINUED | OUTPATIENT
Start: 2022-11-15 | End: 2022-11-15 | Stop reason: HOSPADM

## 2022-11-15 RX ORDER — METHYLERGONOVINE MALEATE 0.2 MG/ML
200 INJECTION INTRAVENOUS
Status: DISCONTINUED | OUTPATIENT
Start: 2022-11-15 | End: 2022-11-17 | Stop reason: HOSPADM

## 2022-11-15 RX ORDER — MISOPROSTOL 200 UG/1
400 TABLET ORAL
Status: DISCONTINUED | OUTPATIENT
Start: 2022-11-15 | End: 2022-11-17 | Stop reason: HOSPADM

## 2022-11-15 RX ORDER — IBUPROFEN 800 MG/1
800 TABLET, FILM COATED ORAL EVERY 6 HOURS PRN
Status: DISCONTINUED | OUTPATIENT
Start: 2022-11-15 | End: 2022-11-17 | Stop reason: HOSPADM

## 2022-11-15 RX ORDER — GABAPENTIN 300 MG/1
600 CAPSULE ORAL AT BEDTIME
Status: DISCONTINUED | OUTPATIENT
Start: 2022-11-15 | End: 2022-11-17 | Stop reason: HOSPADM

## 2022-11-15 RX ORDER — BISACODYL 10 MG
10 SUPPOSITORY, RECTAL RECTAL DAILY PRN
Status: DISCONTINUED | OUTPATIENT
Start: 2022-11-15 | End: 2022-11-17 | Stop reason: HOSPADM

## 2022-11-15 RX ORDER — OXYTOCIN/0.9 % SODIUM CHLORIDE 30/500 ML
1-24 PLASTIC BAG, INJECTION (ML) INTRAVENOUS CONTINUOUS
Status: DISCONTINUED | OUTPATIENT
Start: 2022-11-15 | End: 2022-11-15 | Stop reason: HOSPADM

## 2022-11-15 RX ORDER — IBUPROFEN 800 MG/1
800 TABLET, FILM COATED ORAL
Status: DISCONTINUED | OUTPATIENT
Start: 2022-11-15 | End: 2022-11-17 | Stop reason: HOSPADM

## 2022-11-15 RX ORDER — NALOXONE HYDROCHLORIDE 0.4 MG/ML
0.2 INJECTION, SOLUTION INTRAMUSCULAR; INTRAVENOUS; SUBCUTANEOUS
Status: DISCONTINUED | OUTPATIENT
Start: 2022-11-15 | End: 2022-11-15 | Stop reason: HOSPADM

## 2022-11-15 RX ORDER — OXYTOCIN/0.9 % SODIUM CHLORIDE 30/500 ML
100-340 PLASTIC BAG, INJECTION (ML) INTRAVENOUS CONTINUOUS PRN
Status: DISCONTINUED | OUTPATIENT
Start: 2022-11-15 | End: 2022-11-17 | Stop reason: HOSPADM

## 2022-11-15 RX ORDER — LIDOCAINE 40 MG/G
CREAM TOPICAL
Status: DISCONTINUED | OUTPATIENT
Start: 2022-11-15 | End: 2022-11-15 | Stop reason: HOSPADM

## 2022-11-15 RX ORDER — PROCHLORPERAZINE 25 MG
25 SUPPOSITORY, RECTAL RECTAL EVERY 12 HOURS PRN
Status: DISCONTINUED | OUTPATIENT
Start: 2022-11-15 | End: 2022-11-15 | Stop reason: HOSPADM

## 2022-11-15 RX ORDER — SODIUM CHLORIDE, SODIUM LACTATE, POTASSIUM CHLORIDE, CALCIUM CHLORIDE 600; 310; 30; 20 MG/100ML; MG/100ML; MG/100ML; MG/100ML
INJECTION, SOLUTION INTRAVENOUS CONTINUOUS PRN
Status: DISCONTINUED | OUTPATIENT
Start: 2022-11-15 | End: 2022-11-15 | Stop reason: HOSPADM

## 2022-11-15 RX ORDER — OXYTOCIN/0.9 % SODIUM CHLORIDE 30/500 ML
340 PLASTIC BAG, INJECTION (ML) INTRAVENOUS CONTINUOUS PRN
Status: DISCONTINUED | OUTPATIENT
Start: 2022-11-15 | End: 2022-11-17 | Stop reason: HOSPADM

## 2022-11-15 RX ORDER — KETOROLAC TROMETHAMINE 30 MG/ML
30 INJECTION, SOLUTION INTRAMUSCULAR; INTRAVENOUS
Status: DISCONTINUED | OUTPATIENT
Start: 2022-11-15 | End: 2022-11-17 | Stop reason: HOSPADM

## 2022-11-15 RX ORDER — FENTANYL CITRATE 50 UG/ML
100 INJECTION, SOLUTION INTRAMUSCULAR; INTRAVENOUS
Status: DISCONTINUED | OUTPATIENT
Start: 2022-11-15 | End: 2022-11-15 | Stop reason: HOSPADM

## 2022-11-15 RX ORDER — ONDANSETRON 2 MG/ML
4 INJECTION INTRAMUSCULAR; INTRAVENOUS EVERY 6 HOURS PRN
Status: DISCONTINUED | OUTPATIENT
Start: 2022-11-15 | End: 2022-11-15 | Stop reason: HOSPADM

## 2022-11-15 RX ORDER — LIDOCAINE HCL/EPINEPHRINE/PF 2%-1:200K
VIAL (ML) INJECTION
Status: COMPLETED | OUTPATIENT
Start: 2022-11-15 | End: 2022-11-15

## 2022-11-15 RX ORDER — OXYTOCIN 10 [USP'U]/ML
10 INJECTION, SOLUTION INTRAMUSCULAR; INTRAVENOUS
Status: DISCONTINUED | OUTPATIENT
Start: 2022-11-15 | End: 2022-11-17 | Stop reason: HOSPADM

## 2022-11-15 RX ORDER — CITRIC ACID/SODIUM CITRATE 334-500MG
30 SOLUTION, ORAL ORAL
Status: DISCONTINUED | OUTPATIENT
Start: 2022-11-15 | End: 2022-11-15 | Stop reason: HOSPADM

## 2022-11-15 RX ORDER — PENICILLIN G 3000000 [IU]/50ML
3 INJECTION, SOLUTION INTRAVENOUS EVERY 4 HOURS
Status: DISCONTINUED | OUTPATIENT
Start: 2022-11-15 | End: 2022-11-15 | Stop reason: HOSPADM

## 2022-11-15 RX ORDER — CARBOPROST TROMETHAMINE 250 UG/ML
250 INJECTION, SOLUTION INTRAMUSCULAR
Status: DISCONTINUED | OUTPATIENT
Start: 2022-11-15 | End: 2022-11-15 | Stop reason: HOSPADM

## 2022-11-15 RX ORDER — MISOPROSTOL 200 UG/1
800 TABLET ORAL
Status: DISCONTINUED | OUTPATIENT
Start: 2022-11-15 | End: 2022-11-17 | Stop reason: HOSPADM

## 2022-11-15 RX ORDER — HYDROCORTISONE 25 MG/G
CREAM TOPICAL 3 TIMES DAILY PRN
Status: DISCONTINUED | OUTPATIENT
Start: 2022-11-15 | End: 2022-11-17 | Stop reason: HOSPADM

## 2022-11-15 RX ORDER — MODIFIED LANOLIN
OINTMENT (GRAM) TOPICAL
Status: DISCONTINUED | OUTPATIENT
Start: 2022-11-15 | End: 2022-11-17 | Stop reason: HOSPADM

## 2022-11-15 RX ORDER — ONDANSETRON 4 MG/1
4 TABLET, ORALLY DISINTEGRATING ORAL EVERY 6 HOURS PRN
Status: DISCONTINUED | OUTPATIENT
Start: 2022-11-15 | End: 2022-11-15 | Stop reason: HOSPADM

## 2022-11-15 RX ORDER — MISOPROSTOL 200 UG/1
400 TABLET ORAL
Status: DISCONTINUED | OUTPATIENT
Start: 2022-11-15 | End: 2022-11-15 | Stop reason: HOSPADM

## 2022-11-15 RX ORDER — ACETAMINOPHEN 325 MG/1
650 TABLET ORAL EVERY 4 HOURS PRN
Status: DISCONTINUED | OUTPATIENT
Start: 2022-11-15 | End: 2022-11-17 | Stop reason: HOSPADM

## 2022-11-15 RX ORDER — METOCLOPRAMIDE 10 MG/1
10 TABLET ORAL EVERY 6 HOURS PRN
Status: DISCONTINUED | OUTPATIENT
Start: 2022-11-15 | End: 2022-11-15 | Stop reason: HOSPADM

## 2022-11-15 RX ADMIN — FENTANYL CITRATE 100 MCG: 50 INJECTION, SOLUTION INTRAMUSCULAR; INTRAVENOUS at 09:49

## 2022-11-15 RX ADMIN — ACETAMINOPHEN 650 MG: 325 TABLET, FILM COATED ORAL at 20:44

## 2022-11-15 RX ADMIN — DOCUSATE SODIUM 100 MG: 100 CAPSULE, LIQUID FILLED ORAL at 12:30

## 2022-11-15 RX ADMIN — KETOROLAC TROMETHAMINE 30 MG: 30 INJECTION, SOLUTION INTRAMUSCULAR at 12:29

## 2022-11-15 RX ADMIN — Medication 100 ML/HR: at 11:06

## 2022-11-15 RX ADMIN — ACETAMINOPHEN 650 MG: 325 TABLET, FILM COATED ORAL at 15:54

## 2022-11-15 RX ADMIN — LIDOCAINE HYDROCHLORIDE,EPINEPHRINE BITARTRATE 3 ML: 20; .005 INJECTION, SOLUTION EPIDURAL; INFILTRATION; INTRACAUDAL; PERINEURAL at 10:41

## 2022-11-15 RX ADMIN — GABAPENTIN 600 MG: 300 CAPSULE ORAL at 20:44

## 2022-11-15 RX ADMIN — SODIUM CHLORIDE, POTASSIUM CHLORIDE, SODIUM LACTATE AND CALCIUM CHLORIDE: 600; 310; 30; 20 INJECTION, SOLUTION INTRAVENOUS at 09:00

## 2022-11-15 RX ADMIN — BUPIVACAINE HYDROCHLORIDE 5 ML: 2.5 INJECTION, SOLUTION EPIDURAL; INFILTRATION; INTRACAUDAL at 10:44

## 2022-11-15 RX ADMIN — PENICILLIN G POTASSIUM 5 MILLION UNITS: 5000000 POWDER, FOR SOLUTION INTRAMUSCULAR; INTRAPLEURAL; INTRATHECAL; INTRAVENOUS at 09:01

## 2022-11-15 RX ADMIN — IBUPROFEN 800 MG: 800 TABLET ORAL at 19:16

## 2022-11-15 RX ADMIN — Medication 2 MILLI-UNITS/MIN: at 09:05

## 2022-11-15 ASSESSMENT — ACTIVITIES OF DAILY LIVING (ADL)
ADLS_ACUITY_SCORE: 18
TOILETING_ISSUES: NO
ADLS_ACUITY_SCORE: 18
WALKING_OR_CLIMBING_STAIRS_DIFFICULTY: NO
ADLS_ACUITY_SCORE: 18
ADLS_ACUITY_SCORE: 18
ADLS_ACUITY_SCORE: 31
CONCENTRATING,_REMEMBERING_OR_MAKING_DECISIONS_DIFFICULTY: NO
WEAR_GLASSES_OR_BLIND: NO
FALL_HISTORY_WITHIN_LAST_SIX_MONTHS: NO
ADLS_ACUITY_SCORE: 18
DIFFICULTY_EATING/SWALLOWING: NO
ADLS_ACUITY_SCORE: 18
ADLS_ACUITY_SCORE: 18
DRESSING/BATHING_DIFFICULTY: NO
DOING_ERRANDS_INDEPENDENTLY_DIFFICULTY: NO
CHANGE_IN_FUNCTIONAL_STATUS_SINCE_ONSET_OF_CURRENT_ILLNESS/INJURY: NO

## 2022-11-15 NOTE — ANESTHESIA PROCEDURE NOTES
"Epidural catheter Procedure Note    Pre-Procedure   Staff -        Anesthesiologist:  Sanjuana Neves MD       Performed By: anesthesiologist       Location: OB       Procedure Start/Stop Times: 11/15/2022 10:27 AM and 11/15/2022 10:46 AM       Pre-Anesthestic Checklist: patient identified, IV checked, risks and benefits discussed, informed consent, monitors and equipment checked, pre-op evaluation, at physician/surgeon's request and post-op pain management  Timeout:       Correct Patient: Yes        Correct Procedure: Yes        Correct Site: Yes        Correct Position: Yes   Procedure Documentation  Procedure: epidural catheter       Patient Position: sitting       Skin prep: Chloraprep       Insertion Site: L2-3. (midline approach).       Technique: LORT saline        LIZ at 8 cm.       Needle Gauge: 20.        Needle Length (Inches): 3.5           Catheter threaded easily.         5 cm epidural space.           # of attempts: 1 and  # of redirects:     Assessment/Narrative         Paresthesias: No.       Test dose of 3 mL lidocaine 1.5% w/ 1:200,000 epinephrine at.         Test dose negative, 3 minutes after injection, for signs of intravascular, subdural, or intrathecal injection.       Insertion/Infusion Method: LORT saline       Aspiration negative for Heme or CSF via Epidural Catheter.    Medication(s) Administered   0.25% Bupivacaine PF (Epidural) - EPIDURAL   5 mL - 11/15/2022 10:44:00 AM  2% Lidocaine w/ 1:200K Epi (EPIDURAL) - EPIDURAL   3 mL - 11/15/2022 10:41:00 AM  Medication Administration Time: 11/15/2022 10:27 AM      FOR [facility] ([facility]) ONLY:   Pain Team Contact information: please page the Pain Team Via "[employer]. Search \"Pain\". During daytime hours, please page the attending first. At night please page the resident first.    "
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
## 2022-11-15 NOTE — L&D DELIVERY NOTE
Delivery Summary    Eden Castro MRN# [id number]   Age: 31 year old YOB: 1990     ASSESSMENT & PLAN: ASVD  PICLAUDIA Castro, Female-Eden [[id number]]    Rupture date/time: 11/15/22 0855   Rupture type: Artificial Rupture of Membranes  Fluid color: Clear  Fluid odor: Normal     Induction: Oxytocin, AROM  Induction date/time:     Cervical ripening date/time:     Indications for induction: Mild Preeclampsia     Delivery/Placenta Date and Time    Delivery Date: 11/15/22 Delivery Time: 10:45 AM    Other personnel present at delivery:  Provider Role   Paul Gonzalez MD          Cord    Cord Complications: Nuchal   Nuchal Intervention: delivered through         Nuchal cord description: loose nuchal cord         Stem cell collection?: No       Labor Events and Shoulder Dystocia    Fetal Tracing Prior to Delivery: Category 1  Shoulder dystocia present?: Neg     Delivery (Maternal) (Provider to Complete) ([id number])    Episiotomy: None  Perineal lacerations: None    Est. blood loss (mL): 250  Repair suture: None  Number of repair packets: 0  Genital tract inspection done: Pos     Blood Loss  Mother: Eden Castro #[id number]   Start of Mother's Information    Delivery Blood Loss  11/14/22 2245 - 11/15/22 1102    EBL (mL) Hospital Encounter 250 mL    Total  250 mL         End of Mother's Information  Mother: Eden Castro #[id number]          Delivery - Provider to Complete ([id number])    Attempted Delivery Types (Choose all that apply): Spontaneous Vaginal Delivery  Delivery Type (Choose the 1 that will go to the Birth History): Vaginal, Spontaneous                   Other personnel:  Provider Role   Paul Gonzalez MD                 Placenta    Removal: Spontaneous  Disposition: Hospital disposal           Presentation and Position    Presentation: Vertex    Position: Left Occiput Anterior                 Paul Gonzalez MD

## 2022-11-15 NOTE — PLAN OF CARE
Problem: Labor  Goal: Hemostasis  Outcome: Met  Goal: Stable Fetal Wellbeing  Outcome: Met  Goal: Effective Progression to Delivery  Outcome: Met  Goal: Absence of Infection Signs and Symptoms  Outcome: Met  Goal: Acceptable Pain Control  Outcome: Met  Goal: Normal Uterine Contraction Pattern  Outcome: Met    Pt stable and VS wnl following delivery. Fundus firm and bleeding light. Bondimg well with baby

## 2022-11-16 PROCEDURE — 120N000001 HC R&B MED SURG/OB

## 2022-11-16 PROCEDURE — 250N000013 HC RX MED GY IP 250 OP 250 PS 637: Performed by: OBSTETRICS & GYNECOLOGY

## 2022-11-16 RX ADMIN — IBUPROFEN 800 MG: 800 TABLET ORAL at 13:32

## 2022-11-16 RX ADMIN — IBUPROFEN 800 MG: 800 TABLET ORAL at 07:03

## 2022-11-16 RX ADMIN — Medication: at 06:10

## 2022-11-16 RX ADMIN — DOCUSATE SODIUM 100 MG: 100 CAPSULE, LIQUID FILLED ORAL at 08:31

## 2022-11-16 RX ADMIN — GABAPENTIN 300 MG: 300 CAPSULE ORAL at 08:32

## 2022-11-16 RX ADMIN — GABAPENTIN 600 MG: 300 CAPSULE ORAL at 20:28

## 2022-11-16 RX ADMIN — IBUPROFEN 800 MG: 800 TABLET ORAL at 01:07

## 2022-11-16 RX ADMIN — IBUPROFEN 800 MG: 800 TABLET ORAL at 20:28

## 2022-11-16 RX ADMIN — GABAPENTIN 300 MG: 300 CAPSULE ORAL at 11:52

## 2022-11-16 RX ADMIN — ACETAMINOPHEN 650 MG: 325 TABLET, FILM COATED ORAL at 10:09

## 2022-11-16 RX ADMIN — ACETAMINOPHEN 650 MG: 325 TABLET, FILM COATED ORAL at 06:00

## 2022-11-16 RX ADMIN — ACETAMINOPHEN 650 MG: 325 TABLET, FILM COATED ORAL at 20:28

## 2022-11-16 RX ADMIN — ACETAMINOPHEN 650 MG: 325 TABLET, FILM COATED ORAL at 15:59

## 2022-11-16 RX ADMIN — ACETAMINOPHEN 650 MG: 325 TABLET, FILM COATED ORAL at 01:07

## 2022-11-16 ASSESSMENT — ACTIVITIES OF DAILY LIVING (ADL)
ADLS_ACUITY_SCORE: 18

## 2022-11-16 NOTE — PROGRESS NOTES
"OB Post Partum Note    ASSESSMENT: PLAN:     PPD#1 spontaneous vaginal delivery  Desires pptl- will get set up for tomorrow, .npo after midnight  Doing well  Routine cares  Anticipate discharge to home in am    SUBJECTIVE:   no complaints, denies fever, chills.  Tolerating po, ambulating.  Baby doing well    OBJECTIVE:    /53 (BP Location: Right arm, Patient Position: Left side, Cuff Size: Adult Regular)   Pulse 76   Temp 98.3  F (36.8  C) (Oral)   Resp 16   Ht 1.626 m (5' 4\")   Wt 97.5 kg (215 lb)   LMP 02/25/2022 (Approximate)   SpO2 98%   Breastfeeding Unknown   BMI 36.90 kg/m        abd- fundus firm  Ext- no tenderness,   cv- regular rate  Lungs- clear    Gayatri Flowers MD  [facility]  [phone]    "

## 2022-11-16 NOTE — PLAN OF CARE
Eden's vitals and assessment are WDL. Pain adequately controlled with tylenol and ibuprofen. Patient reports perianal and lower back pain. She also stated she had a slight headache that resolved quickly on its own. Eden denies any questions or concerns at this time. Breastfeeding infant well independently, caring for infant needs and bonding well.    Problem: Plan of Care - These are the overarching goals to be used throughout the patient stay.    Goal: Optimal Comfort and Wellbeing  Outcome: Progressing  Intervention: Provide Person-Centered Care  Recent Flowsheet Documentation  Taken 11/16/2022 0105 by Chery Jaquez, RN  Trust Relationship/Rapport:   care explained   choices provided     Problem: Postpartum (Vaginal Delivery)  Goal: Successful Maternal Role Transition  Outcome: Progressing     Problem: Postpartum (Vaginal Delivery)  Goal: Optimal Pain Control and Function  Outcome: Progressing

## 2022-11-16 NOTE — PROGRESS NOTES
At 1800, the patient called this RN into the room saying she suddenly has swelling in her hands and face that was not there 1 hour ago; she related that she could previously spin her wedding ring on her finger and she is now unable to get it off.  She denied HA, vision changes or R upper epigastric discomfort.  Her BP was 141/71; was 129/71 I hour prior.  Will retake her BP at 1900 and reevaluate sxs.   1916 BP was 137/77, still no sxs; 2044 BP was 132/76, no sxs, no clonus, 2+ reflexes, edema unchanged.

## 2022-11-16 NOTE — PROVIDER NOTIFICATION
Clarified with Dr. Gayatri Flowers at 1000 if she wanted the pt NPO now or at midnight. Dr. Flowers stated that she wanted the pt NPO on 11/17 at 0000 for the postpartum tubal ligation procedure.

## 2022-11-16 NOTE — PLAN OF CARE
Eden's VSS.  She's comfortably ambulatory and voiding well.  Breastfeeding is going well; baby has a comfortable latch and is sucking and swallowing.   (Please see previous note re: swelling and BP.)    Problem: Postpartum (Vaginal Delivery)  Goal: Successful Maternal Role Transition  Outcome: Progressing   Eden is enjoying her baby, holding her a nursing her most of the evening.  FOB is in the room and he is supportive and trying to help care for baby.  He changed is first diaper this evening and it went well.    Problem: Postpartum (Vaginal Delivery)  Goal: Optimal Pain Control and Function  Outcome: Progressing   Eden c/o pressure in her rectal area and occasional uterine cramping and is taking Ibuprofen and Tylenol with adequate results.

## 2022-11-16 NOTE — PLAN OF CARE
Problem: Plan of Care   Goal: Plan of Care Review  Outcome: Progressing     Problem: Postpartum (Vaginal Delivery)  Goal: Optimal Pain Control and Function  Outcome: Progressing    VSS. Fundus firm. Up independently in the room. Pt took a sitz bath this shift. Pt c/o 4-5/10 pain, prn tylenol and ibuprofen given with tolerable pain control. Breastfeeding and pumping Q 2-3 hours. Bonding well with baby.

## 2022-11-16 NOTE — ANESTHESIA POSTPROCEDURE EVALUATION
Patient: Eden Castro    Procedure: * No procedures listed *       Anesthesia Type:  Epidural    Note:  Disposition: Inpatient   Postop Pain Control: Uneventful            Sign Out: Well controlled pain   PONV: No   Neuro/Psych: Uneventful            Sign Out: Acceptable/Baseline neuro status   Airway/Respiratory: Uneventful            Sign Out: Acceptable/Baseline resp. status   CV/Hemodynamics: Uneventful            Sign Out: Acceptable CV status; No obvious hypovolemia; No obvious fluid overload   Other NRE: NONE   DID A NON-ROUTINE EVENT OCCUR? No           Last vitals:  Vitals:    11/15/22 1916 11/15/22 2044 11/16/22 0105   BP: 137/77 132/76 107/53   Pulse:  76 76   Resp:  16 16   Temp:  37  C (98.6  F) 36.8  C (98.3  F)   SpO2:  98% 98%       Electronically Signed By: Preston Garcia MD  November 16, 2022  9:46 AM

## 2022-11-17 VITALS
HEIGHT: 64 IN | TEMPERATURE: 97.8 F | DIASTOLIC BLOOD PRESSURE: 83 MMHG | OXYGEN SATURATION: 99 % | SYSTOLIC BLOOD PRESSURE: 136 MMHG | WEIGHT: 215 LBS | RESPIRATION RATE: 18 BRPM | HEART RATE: 83 BPM | BODY MASS INDEX: 36.7 KG/M2

## 2022-11-17 PROCEDURE — 250N000013 HC RX MED GY IP 250 OP 250 PS 637: Performed by: OBSTETRICS & GYNECOLOGY

## 2022-11-17 RX ORDER — IBUPROFEN 600 MG/1
600 TABLET, FILM COATED ORAL EVERY 6 HOURS PRN
Qty: 40 TABLET | Refills: 0 | Status: SHIPPED | OUTPATIENT
Start: 2022-11-17 | End: 2023-02-28

## 2022-11-17 RX ADMIN — GABAPENTIN 300 MG: 300 CAPSULE ORAL at 08:34

## 2022-11-17 RX ADMIN — ACETAMINOPHEN 650 MG: 325 TABLET, FILM COATED ORAL at 06:20

## 2022-11-17 RX ADMIN — IBUPROFEN 800 MG: 800 TABLET ORAL at 06:20

## 2022-11-17 RX ADMIN — ACETAMINOPHEN 650 MG: 325 TABLET, FILM COATED ORAL at 00:46

## 2022-11-17 ASSESSMENT — ACTIVITIES OF DAILY LIVING (ADL)
ADLS_ACUITY_SCORE: 18

## 2022-11-17 NOTE — PLAN OF CARE
Patient vital signs and assessment findings were WNL. Patient is ambulating independently and voiding without difficulty. Patient is taking tylenol and ibuprofen for pain management. Patient would like to discharge today.

## 2022-11-17 NOTE — PROGRESS NOTES
"Outreach Nurse Note    Eden Castro  [id number]  1990    Chart reviewed, discharge follow-up plan discussed with patient's bedside RN, needs assessed. Post-delivery follow-up appointment planned in 6 weeks at [facility]. Patient, Eden, is reported to have support at home and feels ready to discharge today with , \"Rox Seo\".      Outreach nurse will continue to follow and assist with discharge planning as needed. No additional needs identified at this time.           "

## 2022-11-17 NOTE — PLAN OF CARE
Discharge summary and education gone over with pt. All questions and concerns were answered at this time. Pt will be discharging to home with infant in the car seat.

## 2022-11-18 NOTE — DISCHARGE SUMMARY
[facility] Discharge Summary    Eden Castro MRN# [id number]   Age: 31 year old YOB: 1990     Date of Admission:  11/15/2022  Date of Discharge::  11/17/2022 11:00 AM  Admitting Physician:  Paul Gonzalez MD  Discharge Physician:  Gayatri Flowers MD     Home clinic: [facility]            Admission Diagnoses:   Pregnancy [Z34.90]  Encounter for induciton  Gestational hypertension           Discharge Diagnosis:   Normal spontaneous vaginal delivery  Intrauterine pregnancy at 37 weeks gestation          Procedures:   Procedure(s):  induction of labor, spontaneous vaginal delivery                    Medications Prior to Admission:     No medications prior to admission.             Discharge Medications:     Discharge Medication List as of 11/17/2022 10:53 AM      START taking these medications    Details   ibuprofen (ADVIL/MOTRIN) 600 MG tablet Take 1 tablet (600 mg) by mouth every 6 hours as needed, Disp-40 tablet, R-0, E-Prescribe         CONTINUE these medications which have NOT CHANGED    Details   acetaminophen (TYLENOL) 325 MG tablet Take 325-650 mg by mouth every 6 hours as needed for mild pain, Historical      GABAPENTIN PO Take 300 mg by mouth 3 times daily 300mg AM  300mg NOON  600mg PM, Historical      magnesium 250 MG tablet Take 1 tablet by mouth daily Restless legs, Historical      omeprazole 20 MG tablet Take 20 mg by mouth daily, Historical      ondansetron (ZOFRAN) 8 MG tablet Take 8 mg by mouth every 8 hours as needed for nausea, Historical                   Consultations:   No consultations were requested during this admission          Brief History of Labor:   Patient admitted for  induction of labor due to gestational hypertension. Started with cervical ripening and progressed to pitocin.  Progressed into labor and to complete.  spontaneous vaginal delivery without complications.            Hospital Course:   The patient's hospital course was  unremarkable.  On discharge, her pain was well controlled. Vaginal bleeding is similar to peak menstrual flow.  Voiding without difficulty.  Ambulating well and tolerating a normal diet.  No fever.  Breastfeeding well.  Infant is stable.  No bowel movement yet.*  She was discharged on post-partum day #2.    Post-partum hemoglobin:   Hemoglobin   Date Value Ref Range Status   11/15/2022 10.8 (L) 11.7 - 15.7 g/dL Final   03/16/2018 13.6 11.7 - 15.7 g/dL Final             Discharge Instructions and Follow-Up:   Discharge diet: Regular   Discharge activity: No sex for 6 week(s)   Discharge follow-up: Follow up with [facility]  in 6 weeks   Wound care:            Discharge Disposition:   Discharged to home      Attestation:  I have reviewed today's vital signs, notes, medications, labs and imaging.    Gayatri Flowers MD

## 2023-01-20 ENCOUNTER — TRANSFERRED RECORDS (OUTPATIENT)
Dept: HEALTH INFORMATION MANAGEMENT | Facility: CLINIC | Age: 33
End: 2023-01-20

## 2023-02-21 ENCOUNTER — ANESTHESIA EVENT (OUTPATIENT)
Dept: SURGERY | Facility: HOSPITAL | Age: 33
End: 2023-02-21
Payer: COMMERCIAL

## 2023-02-22 ENCOUNTER — HOSPITAL ENCOUNTER (OUTPATIENT)
Facility: HOSPITAL | Age: 33
Discharge: HOME | End: 2023-02-22
Attending: OBSTETRICS & GYNECOLOGY | Admitting: OBSTETRICS & GYNECOLOGY
Payer: COMMERCIAL

## 2023-02-22 ENCOUNTER — ANESTHESIA (OUTPATIENT)
Dept: SURGERY | Facility: HOSPITAL | Age: 33
End: 2023-02-22
Payer: COMMERCIAL

## 2023-02-22 VITALS
BODY MASS INDEX: 31.84 KG/M2 | SYSTOLIC BLOOD PRESSURE: 128 MMHG | WEIGHT: 186.5 LBS | DIASTOLIC BLOOD PRESSURE: 66 MMHG | HEART RATE: 66 BPM | OXYGEN SATURATION: 98 % | HEIGHT: 64 IN | TEMPERATURE: 96.2 F | RESPIRATION RATE: 14 BRPM

## 2023-02-22 DIAGNOSIS — N81.4 UTEROVAGINAL PROLAPSE: Primary | ICD-10-CM

## 2023-02-22 LAB
ABO/RH(D): NORMAL
ANTIBODY SCREEN: NEGATIVE
BASOPHILS # BLD AUTO: 0 10E3/UL (ref 0–0.2)
BASOPHILS NFR BLD AUTO: 1 %
EOSINOPHIL # BLD AUTO: 0.2 10E3/UL (ref 0–0.7)
EOSINOPHIL NFR BLD AUTO: 3 %
ERYTHROCYTE [DISTWIDTH] IN BLOOD BY AUTOMATED COUNT: 14.5 % (ref 10–15)
HCG UR QL: NEGATIVE
HCT VFR BLD AUTO: 39.3 % (ref 35–47)
HGB BLD-MCNC: 12.9 G/DL (ref 11.7–15.7)
IMM GRANULOCYTES # BLD: 0 10E3/UL
IMM GRANULOCYTES NFR BLD: 0 %
LYMPHOCYTES # BLD AUTO: 2 10E3/UL (ref 0.8–5.3)
LYMPHOCYTES NFR BLD AUTO: 28 %
MCH RBC QN AUTO: 26.5 PG (ref 26.5–33)
MCHC RBC AUTO-ENTMCNC: 32.8 G/DL (ref 31.5–36.5)
MCV RBC AUTO: 81 FL (ref 78–100)
MONOCYTES # BLD AUTO: 0.4 10E3/UL (ref 0–1.3)
MONOCYTES NFR BLD AUTO: 6 %
NEUTROPHILS # BLD AUTO: 4.6 10E3/UL (ref 1.6–8.3)
NEUTROPHILS NFR BLD AUTO: 62 %
NRBC # BLD AUTO: 0 10E3/UL
NRBC BLD AUTO-RTO: 0 /100
PLATELET # BLD AUTO: 233 10E3/UL (ref 150–450)
RBC # BLD AUTO: 4.86 10E6/UL (ref 3.8–5.2)
SPECIMEN EXPIRATION DATE: NORMAL
WBC # BLD AUTO: 7.3 10E3/UL (ref 4–11)

## 2023-02-22 PROCEDURE — 250N000011 HC RX IP 250 OP 636: Performed by: NURSE ANESTHETIST, CERTIFIED REGISTERED

## 2023-02-22 PROCEDURE — 250N000025 HC SEVOFLURANE, PER MIN: Performed by: OBSTETRICS & GYNECOLOGY

## 2023-02-22 PROCEDURE — 999N000141 HC STATISTIC PRE-PROCEDURE NURSING ASSESSMENT: Performed by: OBSTETRICS & GYNECOLOGY

## 2023-02-22 PROCEDURE — 250N000009 HC RX 250: Performed by: NURSE ANESTHETIST, CERTIFIED REGISTERED

## 2023-02-22 PROCEDURE — 360N000080 HC SURGERY LEVEL 7, PER MIN: Performed by: OBSTETRICS & GYNECOLOGY

## 2023-02-22 PROCEDURE — 258N000003 HC RX IP 258 OP 636: Performed by: ANESTHESIOLOGY

## 2023-02-22 PROCEDURE — 272N000001 HC OR GENERAL SUPPLY STERILE: Performed by: OBSTETRICS & GYNECOLOGY

## 2023-02-22 PROCEDURE — 250N000013 HC RX MED GY IP 250 OP 250 PS 637: Performed by: ANESTHESIOLOGY

## 2023-02-22 PROCEDURE — 370N000017 HC ANESTHESIA TECHNICAL FEE, PER MIN: Performed by: OBSTETRICS & GYNECOLOGY

## 2023-02-22 PROCEDURE — 250N000011 HC RX IP 250 OP 636: Performed by: ANESTHESIOLOGY

## 2023-02-22 PROCEDURE — 250N000009 HC RX 250: Performed by: OBSTETRICS & GYNECOLOGY

## 2023-02-22 PROCEDURE — 86901 BLOOD TYPING SEROLOGIC RH(D): CPT | Performed by: PHYSICIAN ASSISTANT

## 2023-02-22 PROCEDURE — 258N000003 HC RX IP 258 OP 636: Performed by: NURSE ANESTHETIST, CERTIFIED REGISTERED

## 2023-02-22 PROCEDURE — 250N000011 HC RX IP 250 OP 636: Performed by: OBSTETRICS & GYNECOLOGY

## 2023-02-22 PROCEDURE — 81025 URINE PREGNANCY TEST: CPT | Performed by: PHYSICIAN ASSISTANT

## 2023-02-22 PROCEDURE — 710N000009 HC RECOVERY PHASE 1, LEVEL 1, PER MIN: Performed by: OBSTETRICS & GYNECOLOGY

## 2023-02-22 PROCEDURE — 88305 TISSUE EXAM BY PATHOLOGIST: CPT | Mod: 26 | Performed by: PATHOLOGY

## 2023-02-22 PROCEDURE — 258N000003 HC RX IP 258 OP 636: Performed by: OBSTETRICS & GYNECOLOGY

## 2023-02-22 PROCEDURE — C1781 MESH (IMPLANTABLE): HCPCS | Performed by: OBSTETRICS & GYNECOLOGY

## 2023-02-22 PROCEDURE — 250N000011 HC RX IP 250 OP 636: Performed by: PHYSICIAN ASSISTANT

## 2023-02-22 PROCEDURE — 36415 COLL VENOUS BLD VENIPUNCTURE: CPT | Performed by: PHYSICIAN ASSISTANT

## 2023-02-22 PROCEDURE — 710N000012 HC RECOVERY PHASE 2, PER MINUTE: Performed by: OBSTETRICS & GYNECOLOGY

## 2023-02-22 PROCEDURE — 88305 TISSUE EXAM BY PATHOLOGIST: CPT | Mod: TC | Performed by: OBSTETRICS & GYNECOLOGY

## 2023-02-22 PROCEDURE — 85025 COMPLETE CBC W/AUTO DIFF WBC: CPT | Performed by: PHYSICIAN ASSISTANT

## 2023-02-22 DEVICE — SLING Y MESH RESTORELLE CONTOUR 3X24CM 501520: Type: IMPLANTABLE DEVICE | Site: PELVIS | Status: FUNCTIONAL

## 2023-02-22 RX ORDER — ACETAMINOPHEN 325 MG/1
975 TABLET ORAL ONCE
Status: COMPLETED | OUTPATIENT
Start: 2023-02-22 | End: 2023-02-22

## 2023-02-22 RX ORDER — OXYCODONE HYDROCHLORIDE 5 MG/1
5 TABLET ORAL
Status: DISCONTINUED | OUTPATIENT
Start: 2023-02-22 | End: 2023-02-22 | Stop reason: HOSPADM

## 2023-02-22 RX ORDER — ERGOCALCIFEROL 1.25 MG/1
50000 CAPSULE, LIQUID FILLED ORAL WEEKLY
COMMUNITY
End: 2023-02-28

## 2023-02-22 RX ORDER — MAGNESIUM SULFATE 4 G/50ML
4 INJECTION INTRAVENOUS ONCE
Status: COMPLETED | OUTPATIENT
Start: 2023-02-22 | End: 2023-02-22

## 2023-02-22 RX ORDER — SODIUM CHLORIDE, SODIUM LACTATE, POTASSIUM CHLORIDE, AND CALCIUM CHLORIDE .6; .31; .03; .02 G/100ML; G/100ML; G/100ML; G/100ML
IRRIGANT IRRIGATION PRN
Status: DISCONTINUED | OUTPATIENT
Start: 2023-02-22 | End: 2023-02-22 | Stop reason: HOSPADM

## 2023-02-22 RX ORDER — PROPOFOL 10 MG/ML
INJECTION, EMULSION INTRAVENOUS PRN
Status: DISCONTINUED | OUTPATIENT
Start: 2023-02-22 | End: 2023-02-22

## 2023-02-22 RX ORDER — CEFAZOLIN SODIUM/WATER 2 G/20 ML
2 SYRINGE (ML) INTRAVENOUS SEE ADMIN INSTRUCTIONS
Status: DISCONTINUED | OUTPATIENT
Start: 2023-02-22 | End: 2023-02-22 | Stop reason: HOSPADM

## 2023-02-22 RX ORDER — IBUPROFEN 800 MG/1
800 TABLET, FILM COATED ORAL EVERY 6 HOURS PRN
Qty: 30 TABLET | Refills: 0 | Status: SHIPPED | OUTPATIENT
Start: 2023-02-22

## 2023-02-22 RX ORDER — ONDANSETRON 2 MG/ML
INJECTION INTRAMUSCULAR; INTRAVENOUS PRN
Status: DISCONTINUED | OUTPATIENT
Start: 2023-02-22 | End: 2023-02-22

## 2023-02-22 RX ORDER — NALOXONE HYDROCHLORIDE 0.4 MG/ML
0.2 INJECTION, SOLUTION INTRAMUSCULAR; INTRAVENOUS; SUBCUTANEOUS
Status: DISCONTINUED | OUTPATIENT
Start: 2023-02-22 | End: 2023-02-22 | Stop reason: HOSPADM

## 2023-02-22 RX ORDER — NALOXONE HYDROCHLORIDE 0.4 MG/ML
0.4 INJECTION, SOLUTION INTRAMUSCULAR; INTRAVENOUS; SUBCUTANEOUS
Status: DISCONTINUED | OUTPATIENT
Start: 2023-02-22 | End: 2023-02-22 | Stop reason: HOSPADM

## 2023-02-22 RX ORDER — LIDOCAINE HYDROCHLORIDE 10 MG/ML
INJECTION, SOLUTION INFILTRATION; PERINEURAL PRN
Status: DISCONTINUED | OUTPATIENT
Start: 2023-02-22 | End: 2023-02-22

## 2023-02-22 RX ORDER — SODIUM CHLORIDE, SODIUM LACTATE, POTASSIUM CHLORIDE, CALCIUM CHLORIDE 600; 310; 30; 20 MG/100ML; MG/100ML; MG/100ML; MG/100ML
INJECTION, SOLUTION INTRAVENOUS CONTINUOUS
Status: DISCONTINUED | OUTPATIENT
Start: 2023-02-22 | End: 2023-02-22 | Stop reason: HOSPADM

## 2023-02-22 RX ORDER — OXYCODONE HYDROCHLORIDE 5 MG/1
5-10 TABLET ORAL EVERY 4 HOURS PRN
Qty: 12 TABLET | Refills: 0 | Status: SHIPPED | OUTPATIENT
Start: 2023-02-22 | End: 2023-03-01

## 2023-02-22 RX ORDER — ACETAMINOPHEN 325 MG/1
975 TABLET ORAL
Status: DISCONTINUED | OUTPATIENT
Start: 2023-02-22 | End: 2023-02-22 | Stop reason: HOSPADM

## 2023-02-22 RX ORDER — BUPIVACAINE HYDROCHLORIDE 2.5 MG/ML
INJECTION, SOLUTION INFILTRATION; PERINEURAL PRN
Status: DISCONTINUED | OUTPATIENT
Start: 2023-02-22 | End: 2023-02-22 | Stop reason: HOSPADM

## 2023-02-22 RX ORDER — ACETAMINOPHEN 325 MG/1
975 TABLET ORAL ONCE
Status: DISCONTINUED | OUTPATIENT
Start: 2023-02-22 | End: 2023-02-22 | Stop reason: HOSPADM

## 2023-02-22 RX ORDER — MEPERIDINE HYDROCHLORIDE 25 MG/ML
12.5 INJECTION INTRAMUSCULAR; INTRAVENOUS; SUBCUTANEOUS EVERY 5 MIN PRN
Status: DISCONTINUED | OUTPATIENT
Start: 2023-02-22 | End: 2023-02-22 | Stop reason: HOSPADM

## 2023-02-22 RX ORDER — FENTANYL CITRATE 50 UG/ML
25 INJECTION, SOLUTION INTRAMUSCULAR; INTRAVENOUS EVERY 5 MIN PRN
Status: DISCONTINUED | OUTPATIENT
Start: 2023-02-22 | End: 2023-02-22 | Stop reason: HOSPADM

## 2023-02-22 RX ORDER — FENTANYL CITRATE 50 UG/ML
INJECTION, SOLUTION INTRAMUSCULAR; INTRAVENOUS PRN
Status: DISCONTINUED | OUTPATIENT
Start: 2023-02-22 | End: 2023-02-22

## 2023-02-22 RX ORDER — PROPOFOL 10 MG/ML
INJECTION, EMULSION INTRAVENOUS CONTINUOUS PRN
Status: DISCONTINUED | OUTPATIENT
Start: 2023-02-22 | End: 2023-02-22

## 2023-02-22 RX ORDER — ONDANSETRON 4 MG/1
4 TABLET, ORALLY DISINTEGRATING ORAL EVERY 30 MIN PRN
Status: DISCONTINUED | OUTPATIENT
Start: 2023-02-22 | End: 2023-02-22 | Stop reason: HOSPADM

## 2023-02-22 RX ORDER — CEFAZOLIN SODIUM/WATER 2 G/20 ML
2 SYRINGE (ML) INTRAVENOUS
Status: COMPLETED | OUTPATIENT
Start: 2023-02-22 | End: 2023-02-22

## 2023-02-22 RX ORDER — KETAMINE HYDROCHLORIDE 10 MG/ML
INJECTION INTRAMUSCULAR; INTRAVENOUS PRN
Status: DISCONTINUED | OUTPATIENT
Start: 2023-02-22 | End: 2023-02-22

## 2023-02-22 RX ORDER — LORAZEPAM 2 MG/ML
.5-1 INJECTION INTRAMUSCULAR
Status: DISCONTINUED | OUTPATIENT
Start: 2023-02-22 | End: 2023-02-22 | Stop reason: HOSPADM

## 2023-02-22 RX ORDER — FENTANYL CITRATE 50 UG/ML
25 INJECTION, SOLUTION INTRAMUSCULAR; INTRAVENOUS
Status: DISCONTINUED | OUTPATIENT
Start: 2023-02-22 | End: 2023-02-22 | Stop reason: HOSPADM

## 2023-02-22 RX ORDER — LIDOCAINE HYDROCHLORIDE AND EPINEPHRINE 10; 10 MG/ML; UG/ML
INJECTION, SOLUTION INFILTRATION; PERINEURAL PRN
Status: DISCONTINUED | OUTPATIENT
Start: 2023-02-22 | End: 2023-02-22 | Stop reason: HOSPADM

## 2023-02-22 RX ORDER — KETOROLAC TROMETHAMINE 30 MG/ML
15 INJECTION, SOLUTION INTRAMUSCULAR; INTRAVENOUS
Status: DISCONTINUED | OUTPATIENT
Start: 2023-02-22 | End: 2023-02-22 | Stop reason: HOSPADM

## 2023-02-22 RX ORDER — FENTANYL CITRATE 50 UG/ML
50 INJECTION, SOLUTION INTRAMUSCULAR; INTRAVENOUS EVERY 5 MIN PRN
Status: DISCONTINUED | OUTPATIENT
Start: 2023-02-22 | End: 2023-02-22 | Stop reason: HOSPADM

## 2023-02-22 RX ORDER — LIDOCAINE 40 MG/G
CREAM TOPICAL
Status: DISCONTINUED | OUTPATIENT
Start: 2023-02-22 | End: 2023-02-22 | Stop reason: HOSPADM

## 2023-02-22 RX ORDER — OXYCODONE HYDROCHLORIDE 5 MG/1
10 TABLET ORAL EVERY 4 HOURS PRN
Status: DISCONTINUED | OUTPATIENT
Start: 2023-02-22 | End: 2023-02-22 | Stop reason: HOSPADM

## 2023-02-22 RX ORDER — OXYCODONE HYDROCHLORIDE 5 MG/1
5 TABLET ORAL EVERY 4 HOURS PRN
Status: DISCONTINUED | OUTPATIENT
Start: 2023-02-22 | End: 2023-02-22 | Stop reason: HOSPADM

## 2023-02-22 RX ORDER — ONDANSETRON 2 MG/ML
4 INJECTION INTRAMUSCULAR; INTRAVENOUS EVERY 30 MIN PRN
Status: DISCONTINUED | OUTPATIENT
Start: 2023-02-22 | End: 2023-02-22 | Stop reason: HOSPADM

## 2023-02-22 RX ORDER — IBUPROFEN 200 MG
800 TABLET ORAL ONCE
Status: DISCONTINUED | OUTPATIENT
Start: 2023-02-22 | End: 2023-02-22 | Stop reason: HOSPADM

## 2023-02-22 RX ORDER — DEXAMETHASONE SODIUM PHOSPHATE 10 MG/ML
INJECTION, SOLUTION INTRAMUSCULAR; INTRAVENOUS PRN
Status: DISCONTINUED | OUTPATIENT
Start: 2023-02-22 | End: 2023-02-22

## 2023-02-22 RX ORDER — GLYCOPYRROLATE 0.2 MG/ML
INJECTION, SOLUTION INTRAMUSCULAR; INTRAVENOUS PRN
Status: DISCONTINUED | OUTPATIENT
Start: 2023-02-22 | End: 2023-02-22

## 2023-02-22 RX ADMIN — ROCURONIUM BROMIDE 50 MG: 50 INJECTION, SOLUTION INTRAVENOUS at 07:49

## 2023-02-22 RX ADMIN — ACETAMINOPHEN 975 MG: 325 TABLET ORAL at 06:40

## 2023-02-22 RX ADMIN — MIDAZOLAM 1 MG: 1 INJECTION INTRAMUSCULAR; INTRAVENOUS at 07:56

## 2023-02-22 RX ADMIN — GLYCOPYRROLATE 0.2 MG: 0.2 INJECTION, SOLUTION INTRAMUSCULAR; INTRAVENOUS at 08:27

## 2023-02-22 RX ADMIN — ONDANSETRON 4 MG: 2 INJECTION INTRAMUSCULAR; INTRAVENOUS at 07:16

## 2023-02-22 RX ADMIN — PROPOFOL 200 MG: 10 INJECTION, EMULSION INTRAVENOUS at 07:49

## 2023-02-22 RX ADMIN — MIDAZOLAM 1 MG: 1 INJECTION INTRAMUSCULAR; INTRAVENOUS at 10:20

## 2023-02-22 RX ADMIN — ONDANSETRON 4 MG: 2 INJECTION INTRAMUSCULAR; INTRAVENOUS at 10:33

## 2023-02-22 RX ADMIN — FENTANYL CITRATE 50 MCG: 50 INJECTION, SOLUTION INTRAMUSCULAR; INTRAVENOUS at 07:44

## 2023-02-22 RX ADMIN — PHENYLEPHRINE HYDROCHLORIDE 50 MCG: 10 INJECTION INTRAVENOUS at 08:32

## 2023-02-22 RX ADMIN — FENTANYL CITRATE 50 MCG: 50 INJECTION, SOLUTION INTRAMUSCULAR; INTRAVENOUS at 10:27

## 2023-02-22 RX ADMIN — DEXAMETHASONE SODIUM PHOSPHATE 10 MG: 10 INJECTION, SOLUTION INTRAMUSCULAR; INTRAVENOUS at 07:56

## 2023-02-22 RX ADMIN — KETAMINE HYDROCHLORIDE 50 MG: 10 INJECTION, SOLUTION INTRAMUSCULAR; INTRAVENOUS at 07:49

## 2023-02-22 RX ADMIN — Medication 2 G: at 07:56

## 2023-02-22 RX ADMIN — LIDOCAINE HYDROCHLORIDE 50 MG: 10 INJECTION, SOLUTION INFILTRATION; PERINEURAL at 07:49

## 2023-02-22 RX ADMIN — MIDAZOLAM 2 MG: 1 INJECTION INTRAMUSCULAR; INTRAVENOUS at 07:16

## 2023-02-22 RX ADMIN — OXYCODONE HYDROCHLORIDE 10 MG: 5 TABLET ORAL at 11:50

## 2023-02-22 RX ADMIN — HYDROMORPHONE HYDROCHLORIDE 0.2 MG: 1 INJECTION, SOLUTION INTRAMUSCULAR; INTRAVENOUS; SUBCUTANEOUS at 11:04

## 2023-02-22 RX ADMIN — PROPOFOL 30 MCG/KG/MIN: 10 INJECTION, EMULSION INTRAVENOUS at 07:55

## 2023-02-22 RX ADMIN — SUGAMMADEX 170 MG: 100 INJECTION, SOLUTION INTRAVENOUS at 10:07

## 2023-02-22 RX ADMIN — FENTANYL CITRATE 50 MCG: 50 INJECTION, SOLUTION INTRAMUSCULAR; INTRAVENOUS at 10:38

## 2023-02-22 RX ADMIN — MAGNESIUM SULFATE HEPTAHYDRATE 4 G: 80 INJECTION, SOLUTION INTRAVENOUS at 06:55

## 2023-02-22 RX ADMIN — SODIUM CHLORIDE, POTASSIUM CHLORIDE, SODIUM LACTATE AND CALCIUM CHLORIDE: 600; 310; 30; 20 INJECTION, SOLUTION INTRAVENOUS at 06:55

## 2023-02-22 RX ADMIN — FENTANYL CITRATE 50 MCG: 50 INJECTION, SOLUTION INTRAMUSCULAR; INTRAVENOUS at 07:56

## 2023-02-22 RX ADMIN — GLYCOPYRROLATE 0.2 MG: 0.2 INJECTION, SOLUTION INTRAMUSCULAR; INTRAVENOUS at 08:29

## 2023-02-22 RX ADMIN — HYDROMORPHONE HYDROCHLORIDE 1 MG: 1 INJECTION, SOLUTION INTRAMUSCULAR; INTRAVENOUS; SUBCUTANEOUS at 08:20

## 2023-02-22 ASSESSMENT — ACTIVITIES OF DAILY LIVING (ADL)
ADLS_ACUITY_SCORE: 20

## 2023-02-22 ASSESSMENT — LIFESTYLE VARIABLES: TOBACCO_USE: 1

## 2023-02-22 NOTE — ANESTHESIA CARE TRANSFER NOTE
Patient: Eden Castro    Procedure: Procedure(s):  ROBOTIC SUPRACERVICAL HYSTERECTOMY BILATERAL SALPINGECTOMY SACROCOLPOPEXY PARAVAGINAL REPAIR POSTERIOR REPAIR       Diagnosis: Cystocele and rectocele with incomplete uterovaginal prolapse [N81.2]  Diagnosis Additional Information: No value filed.    Anesthesia Type:   General     Note:    Oropharynx: oropharynx clear of all foreign objects  Level of Consciousness: drowsy  Oxygen Supplementation: face mask  Level of Supplemental Oxygen (L/min / FiO2): 6  Independent Airway: airway patency satisfactory and stable  Dentition: dentition unchanged  Vital Signs Stable: post-procedure vital signs reviewed and stable  Report to RN Given: handoff report given  Patient transferred to: PACU    Handoff Report: Identifed the Patient, Identified the Reponsible Provider, Reviewed the pertinent medical history, Discussed the surgical course, Reviewed Intra-OP anesthesia mangement and issues during anesthesia, Set expectations for post-procedure period and Allowed opportunity for questions and acknowledgement of understanding      Vitals:  Vitals Value Taken Time   /71 02/22/23 1017   Temp 36.2  C (97.2  F) 02/22/23 1015   Pulse 90 02/22/23 1020   Resp 16 02/22/23 1020   SpO2 99 % 02/22/23 1020   Vitals shown include unvalidated device data.    Electronically Signed By: KOFI Franklin CRNA  February 22, 2023  10:22 AM

## 2023-02-22 NOTE — ANESTHESIA PROCEDURE NOTES
Airway       Patient location during procedure: OR       Procedure Start/Stop Times: 2/22/2023 7:52 AM  Staff -        CRNA: Julieta Cantor APRN CRNA       Performed By: CRNA  Consent for Airway        Urgency: elective  Indications and Patient Condition       Indications for airway management: ria-procedural       Induction type:intravenous       Mask difficulty assessment: 1 - vent by mask    Final Airway Details       Final airway type: endotracheal airway       Successful airway: ETT - single  Endotracheal Airway Details        ETT size (mm): 7.0       Cuffed: yes       Successful intubation technique: direct laryngoscopy       DL Blade Type: Aguiar 2       Grade View of Cords: 1       Adjucts: stylet       Position: Right       Measured from: gums/teeth       Secured at (cm): 22       Bite block used: None    Post intubation assessment        Placement verified by: capnometry, equal breath sounds, chest rise and x-ray        Number of attempts at approach: 1       Number of other approaches attempted: 0       Secured with: silk tape       Ease of procedure: easy       Dentition: Intact and Unchanged       Dental guard used and removed. Dental Guard Type: Proguard Red.    Medication(s) Administered   Medication Administration Time: 2/22/2023 7:52 AM

## 2023-02-22 NOTE — BRIEF OP NOTE
[facility]    Brief Operative Note    Pre-operative diagnosis: Cystocele and rectocele with incomplete uterovaginal prolapse [N81.2]  Post-operative diagnosis Same as pre-operative diagnosis    Procedure: Procedure(s):  ROBOTIC SUPRACERVICAL HYSTERECTOMY BILATERAL SALPINGECTOMY SACROCOLPOPEXY PARAVAGINAL REPAIR POSTERIOR REPAIR  Surgeon: Surgeon(s) and Role:     * Paul Gonzalez MD - Primary  Anesthesia: General   Estimated Blood Loss: 20 mL from 2/22/2023  7:40 AM to 2/22/2023 10:11 AM      Drains: None  Specimens:   ID Type Source Tests Collected by Time Destination   1 : Uterus, Bilateral tubes Morcellated Tissue Uterus, Bilateral Fallopian Tubes SURGICAL PATHOLOGY EXAM Paul Gonzalez MD 2/22/2023  9:40 AM      Findings:   see dictation.  Complications: None.  Implants:   Implant Name Type Inv. Item Serial No.  Lot No. LRB No. Used Action   SLING Y MESH RESTORELLE CONTOUR [id number] - [id number] Mesh SLING Y MESH RESTORELLE CONTOUR [id number]  [employer]OP[employer] [id number]  1 Implanted           
Patient

## 2023-02-22 NOTE — H&P
History and Physical Update    I have examined the patient and reviewed the history and physical that is present on this chart. The changes in the patient's history and physical condition are as follows:    None    Paul Gonzalez MD

## 2023-02-22 NOTE — ANESTHESIA POSTPROCEDURE EVALUATION
Patient: Eden Castro    Procedure: Procedure(s):  ROBOTIC SUPRACERVICAL HYSTERECTOMY BILATERAL SALPINGECTOMY SACROCOLPOPEXY PARAVAGINAL REPAIR POSTERIOR REPAIR       Anesthesia Type:  General    Note:  Disposition: Outpatient   Postop Pain Control: Uneventful            Sign Out: Well controlled pain; Pain at Preoperative BASELINE   PONV: No   Neuro/Psych: Uneventful            Sign Out: Acceptable/Baseline neuro status   Airway/Respiratory: Uneventful            Sign Out: Acceptable/Baseline resp. status   CV/Hemodynamics: Uneventful            Sign Out: Acceptable CV status; No obvious hypovolemia; No obvious fluid overload   Other NRE: NONE   DID A NON-ROUTINE EVENT OCCUR? No    Event details/Postop Comments:  Doing well overall working in narcotic and non-narcotic medication to help with pain understanding that she will have discomfort no matter how much medication we use.  Received one dose of Zofran with good results initially upon arrival in PACU.           Last vitals:  Vitals Value Taken Time   /66 02/22/23 1100   Temp 35.7  C (96.2  F) 02/22/23 1110   Pulse 76 02/22/23 1113   Resp 27 02/22/23 1110   SpO2 95 % 02/22/23 1113   Vitals shown include unvalidated device data.    Electronically Signed By: Pradip Lr MD  February 22, 2023  12:16 PM
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
## 2023-02-24 LAB
PATH REPORT.COMMENTS IMP SPEC: NORMAL
PATH REPORT.COMMENTS IMP SPEC: NORMAL
PATH REPORT.FINAL DX SPEC: NORMAL
PATH REPORT.GROSS SPEC: NORMAL
PATH REPORT.MICROSCOPIC SPEC OTHER STN: NORMAL
PATH REPORT.RELEVANT HX SPEC: NORMAL
PHOTO IMAGE: NORMAL

## 2023-02-25 NOTE — OP NOTE
Procedure Date: 02/22/2023    PREOPERATIVE DIAGNOSIS:  Uterovaginal prolapse.    POSTOPERATIVE DIAGNOSIS:  Uterovaginal prolapse.    PROCEDURE:  Robotic supracervical hysterectomy with bilateral salpingectomy, sacral colpopexy, bilateral paravaginal defect repair, posterior colporrhaphy.    SURGEON:  Paul Gonzalez MD   Female Pelvic Medicine and Reconstructive Surgery      ASSISTANT:  Kathie Bailey.    ANESTHESIA:  General.    ESTIMATED BLOOD LOSS:  20 mL, replaced with lactated Ringer's.    DRAINS:  None.    COMPLICATIONS:  None.    HPI:  This is a 32-year-old para 4 female with increasing symptoms of prolapse with increasing pressure and discomfort and pain with intercourse.  She was given informed consent for the above procedure.  The risks, benefits and alternatives were discussed.  She expressed understanding and wished to proceed.    OPERATIVE FINDINGS:  She had a large cystocele and a moderate apical defect with the cervix coming down to the -2 position as well as a moderate posterior defect.  The uterus was otherwise slightly enlarged.  No discrete abnormalities were noted.  Both ovaries were completely normal and the upper abdomen was normal.    PROCEDURE NOTE:  The patient was brought to the operating room and after induction of general anesthesia, was prepped and draped in the dorsal lithotomy position.  A timeout was called and the patient and the procedure were verified.  A HAY catheter was attached to the cervix.  A Alejandra was placed and attention was directed to the abdomen where a supraumbilical incision was made.  A Veress needle was inserted and the abdomen was insufflated with 3 liters of CO2.  An 8 mm trocar and trocar sheath was inserted under direct visualization and the pelvis was inspected.  Two additional incisions were then made to the right and left and 8 mm trocars were placed again under direct visualization.  The patient was placed in steep Trendelenburg and the robot was docked.  I  then took my place at the console.  Both ureters were identified transperitoneally.  The vessel sealer was used to come across the mesosalpinx.  The round ligament, the uterine suspensory ligament and then a series of parametrial bites were obtained.  Bladder flap was taken down in the usual manner and additional parametrial bites were obtained.  Robotic scissors was then used to take down the peritoneum over the cervix anteriorly and posteriorly, and the bladder was dissected distally.  The same instrument was used to then separate the uterus from the cervix, which was oversewn using 0 Vicryl suture.  Attention was then directed to the sacral promontory where the peritoneum was opened.  The superior hypogastric plexus was dissected medially and the anterior longitudinal ligament exposed.  A Coloplast Y-shaped mesh was then used and fixed to the cervix anteriorly and posteriorly using a V-Loc suture.  [place] forceps was then used to tunnel from the sacral promontory and the long arm of this was brought up to the anterior longitudinal ligament where it was fixed using 3 interrupted [place]-Lam[place] sutures.  The entire area was then reperitonealized with excellent effect.  The bladder was filled with 300 mL of saline and the space of Retzius was opened.  The obturator foramen and vessels were then identified.  The bladder was drained.  The adventitia was dissected and the bilateral paravaginal defect identified.  V-Loc suture was then used to reapproximate the anterior vaginal fascia to the arcus tendineus in a running manner.  Excellent effect was noted.  Good hemostasis was noted.  The area was then reperitonealized.  The robot was undocked.  Morcellator was introduced and the specimen was delivered with multiple passes and submitted for pathologic exam.  Trocars were then removed and the skin was closed with nylon.  Attention was then directed posteriorly where the perineal body and posterior vaginal mucosa were  injected with 1% lidocaine with epinephrine.  Allis clamps were placed at 5 and 7 o'clock on the posterior fourchette and a V-type incision was made in the perineal body.  Vaginal mucosa was then dissected free of the underlying fascia for a width of 1 cm and a distance of 7 cm.  This was then excised and the hernia defect was identified.  Lateral dissection was then carried out and the fascia was brought to the midline in 2 layers using interrupted 0 Vicryl sutures.  Same suture was then used to close the vaginal mucosa.  A 3-0 Vicryl was used to close the perineal body in a subcuticular stitch used the same suture for closure.  Excellent results were noted.  Sponge and instrument counts were correct.  The patient tolerated the procedure well and was taken to the recovery room in good condition.    Paul Gonzalez MD   Female Pelvic Medicine and Reconstructive Surgery          D: 2023   T: 2023   MT: [email or web address]    Name:     AYLA ROQUE  MRN:      [id number]        Account:        [id number]   :      1990           Procedure Date: 2023     Document: [id number]

## 2023-02-28 ENCOUNTER — HOSPITAL ENCOUNTER (INPATIENT)
Facility: HOSPITAL | Age: 33
LOS: 1 days | Discharge: HOME | End: 2023-03-01
Attending: STUDENT IN AN ORGANIZED HEALTH CARE EDUCATION/TRAINING PROGRAM | Admitting: STUDENT IN AN ORGANIZED HEALTH CARE EDUCATION/TRAINING PROGRAM
Payer: COMMERCIAL

## 2023-02-28 ENCOUNTER — HOSPITAL ENCOUNTER (EMERGENCY)
Facility: HOSPITAL | Age: 33
Discharge: HOME | End: 2023-02-28
Payer: COMMERCIAL

## 2023-02-28 ENCOUNTER — LAB REQUISITION (OUTPATIENT)
Dept: LAB | Facility: CLINIC | Age: 33
End: 2023-02-28
Payer: COMMERCIAL

## 2023-02-28 ENCOUNTER — LAB REQUISITION (OUTPATIENT)
Dept: LAB | Facility: CLINIC | Age: 33
End: 2023-02-28

## 2023-02-28 ENCOUNTER — HOSPITAL ENCOUNTER (OUTPATIENT)
Dept: CT IMAGING | Facility: CLINIC | Age: 33
Discharge: HOME | End: 2023-02-28
Attending: OBSTETRICS & GYNECOLOGY | Admitting: OBSTETRICS & GYNECOLOGY
Payer: COMMERCIAL

## 2023-02-28 DIAGNOSIS — G89.18 CHEST WALL PAIN FOLLOWING SURGERY: ICD-10-CM

## 2023-02-28 DIAGNOSIS — G89.18 OTHER ACUTE POSTPROCEDURAL PAIN: ICD-10-CM

## 2023-02-28 DIAGNOSIS — D64.9 ANEMIA, UNSPECIFIED TYPE: ICD-10-CM

## 2023-02-28 DIAGNOSIS — R07.89 CHEST WALL PAIN FOLLOWING SURGERY: ICD-10-CM

## 2023-02-28 DIAGNOSIS — N94.89 PELVIC HEMATOMA, FEMALE: ICD-10-CM

## 2023-02-28 DIAGNOSIS — R39.9 UNSPECIFIED SYMPTOMS AND SIGNS INVOLVING THE GENITOURINARY SYSTEM: ICD-10-CM

## 2023-02-28 DIAGNOSIS — N81.4 UTEROVAGINAL PROLAPSE: ICD-10-CM

## 2023-02-28 DIAGNOSIS — R50.9 FEVER, UNSPECIFIED FEVER CAUSE: ICD-10-CM

## 2023-02-28 LAB
ABO/RH(D): NORMAL
ALBUMIN SERPL BCG-MCNC: 4 G/DL (ref 3.5–5.2)
ALBUMIN UR-MCNC: NEGATIVE MG/DL
ALP SERPL-CCNC: 94 U/L (ref 35–104)
ALT SERPL W P-5'-P-CCNC: 28 U/L (ref 10–35)
ANION GAP SERPL CALCULATED.3IONS-SCNC: 9 MMOL/L (ref 7–15)
ANTIBODY SCREEN: NEGATIVE
APPEARANCE UR: CLEAR
AST SERPL W P-5'-P-CCNC: 16 U/L (ref 10–35)
BASOPHILS # BLD AUTO: 0 10E3/UL (ref 0–0.2)
BASOPHILS # BLD AUTO: 0 10E3/UL (ref 0–0.2)
BASOPHILS NFR BLD AUTO: 0 %
BASOPHILS NFR BLD AUTO: 0 %
BILIRUB SERPL-MCNC: 0.8 MG/DL
BILIRUB UR QL STRIP: NEGATIVE
BUN SERPL-MCNC: 10.7 MG/DL (ref 6–20)
CALCIUM SERPL-MCNC: 8.9 MG/DL (ref 8.6–10)
CHLORIDE SERPL-SCNC: 101 MMOL/L (ref 98–107)
COLOR UR AUTO: ABNORMAL
CREAT SERPL-MCNC: 0.73 MG/DL (ref 0.51–0.95)
DEPRECATED HCO3 PLAS-SCNC: 23 MMOL/L (ref 22–29)
EOSINOPHIL # BLD AUTO: 0.1 10E3/UL (ref 0–0.7)
EOSINOPHIL # BLD AUTO: 0.2 10E3/UL (ref 0–0.7)
EOSINOPHIL NFR BLD AUTO: 1 %
EOSINOPHIL NFR BLD AUTO: 2 %
ERYTHROCYTE [DISTWIDTH] IN BLOOD BY AUTOMATED COUNT: 15 % (ref 10–15)
ERYTHROCYTE [DISTWIDTH] IN BLOOD BY AUTOMATED COUNT: 15.2 % (ref 10–15)
GFR SERPL CREATININE-BSD FRML MDRD: >90 ML/MIN/1.73M2
GLUCOSE SERPL-MCNC: 94 MG/DL (ref 70–99)
GLUCOSE UR STRIP-MCNC: NEGATIVE MG/DL
HCT VFR BLD AUTO: 27.3 % (ref 35–47)
HCT VFR BLD AUTO: 28.3 % (ref 35–47)
HGB BLD-MCNC: 8.6 G/DL (ref 11.7–15.7)
HGB BLD-MCNC: 8.7 G/DL (ref 11.7–15.7)
HGB UR QL STRIP: NEGATIVE
IMM GRANULOCYTES # BLD: 0.1 10E3/UL
IMM GRANULOCYTES # BLD: 0.1 10E3/UL
IMM GRANULOCYTES NFR BLD: 1 %
IMM GRANULOCYTES NFR BLD: 1 %
KETONES UR STRIP-MCNC: NEGATIVE MG/DL
LACTATE SERPL-SCNC: 0.4 MMOL/L (ref 0.7–2)
LEUKOCYTE ESTERASE UR QL STRIP: ABNORMAL
LYMPHOCYTES # BLD AUTO: 1.6 10E3/UL (ref 0.8–5.3)
LYMPHOCYTES # BLD AUTO: 2.3 10E3/UL (ref 0.8–5.3)
LYMPHOCYTES NFR BLD AUTO: 16 %
LYMPHOCYTES NFR BLD AUTO: 24 %
MCH RBC QN AUTO: 26.4 PG (ref 26.5–33)
MCH RBC QN AUTO: 26.8 PG (ref 26.5–33)
MCHC RBC AUTO-ENTMCNC: 30.4 G/DL (ref 31.5–36.5)
MCHC RBC AUTO-ENTMCNC: 31.9 G/DL (ref 31.5–36.5)
MCV RBC AUTO: 84 FL (ref 78–100)
MCV RBC AUTO: 87 FL (ref 78–100)
MONOCYTES # BLD AUTO: 0.7 10E3/UL (ref 0–1.3)
MONOCYTES # BLD AUTO: 0.8 10E3/UL (ref 0–1.3)
MONOCYTES NFR BLD AUTO: 8 %
MONOCYTES NFR BLD AUTO: 8 %
NEUTROPHILS # BLD AUTO: 6.3 10E3/UL (ref 1.6–8.3)
NEUTROPHILS # BLD AUTO: 7.4 10E3/UL (ref 1.6–8.3)
NEUTROPHILS NFR BLD AUTO: 65 %
NEUTROPHILS NFR BLD AUTO: 74 %
NITRATE UR QL: NEGATIVE
NRBC # BLD AUTO: 0 10E3/UL
NRBC # BLD AUTO: 0 10E3/UL
NRBC BLD AUTO-RTO: 0 /100
NRBC BLD AUTO-RTO: 0 /100
PH UR STRIP: 6.5 [PH] (ref 5–7)
PLATELET # BLD AUTO: 285 10E3/UL (ref 150–450)
PLATELET # BLD AUTO: 310 10E3/UL (ref 150–450)
POTASSIUM SERPL-SCNC: 3.7 MMOL/L (ref 3.4–5.3)
PROT SERPL-MCNC: 7 G/DL (ref 6.4–8.3)
RBC # BLD AUTO: 3.25 10E6/UL (ref 3.8–5.2)
RBC # BLD AUTO: 3.26 10E6/UL (ref 3.8–5.2)
RBC URINE: 3 /HPF
SODIUM SERPL-SCNC: 133 MMOL/L (ref 136–145)
SP GR UR STRIP: 1.04 (ref 1–1.03)
SPECIMEN EXPIRATION DATE: NORMAL
SQUAMOUS EPITHELIAL: 2 /HPF
UROBILINOGEN UR STRIP-MCNC: 2 MG/DL
WBC # BLD AUTO: 10 10E3/UL (ref 4–11)
WBC # BLD AUTO: 9.5 10E3/UL (ref 4–11)
WBC URINE: 8 /HPF

## 2023-02-28 PROCEDURE — 36415 COLL VENOUS BLD VENIPUNCTURE: CPT | Performed by: EMERGENCY MEDICINE

## 2023-02-28 PROCEDURE — 81001 URINALYSIS AUTO W/SCOPE: CPT | Performed by: EMERGENCY MEDICINE

## 2023-02-28 PROCEDURE — 87040 BLOOD CULTURE FOR BACTERIA: CPT | Performed by: EMERGENCY MEDICINE

## 2023-02-28 PROCEDURE — 250N000011 HC RX IP 250 OP 636: Performed by: OBSTETRICS & GYNECOLOGY

## 2023-02-28 PROCEDURE — 87086 URINE CULTURE/COLONY COUNT: CPT | Performed by: OBSTETRICS & GYNECOLOGY

## 2023-02-28 PROCEDURE — 250N000011 HC RX IP 250 OP 636: Performed by: EMERGENCY MEDICINE

## 2023-02-28 PROCEDURE — 80053 COMPREHEN METABOLIC PANEL: CPT | Performed by: EMERGENCY MEDICINE

## 2023-02-28 PROCEDURE — 120N000001 HC R&B MED SURG/OB

## 2023-02-28 PROCEDURE — 83605 ASSAY OF LACTIC ACID: CPT | Performed by: EMERGENCY MEDICINE

## 2023-02-28 PROCEDURE — 250N000013 HC RX MED GY IP 250 OP 250 PS 637: Performed by: STUDENT IN AN ORGANIZED HEALTH CARE EDUCATION/TRAINING PROGRAM

## 2023-02-28 PROCEDURE — 250N000013 HC RX MED GY IP 250 OP 250 PS 637: Performed by: EMERGENCY MEDICINE

## 2023-02-28 PROCEDURE — 86901 BLOOD TYPING SEROLOGIC RH(D): CPT | Performed by: EMERGENCY MEDICINE

## 2023-02-28 PROCEDURE — 85025 COMPLETE CBC W/AUTO DIFF WBC: CPT | Performed by: OBSTETRICS & GYNECOLOGY

## 2023-02-28 PROCEDURE — 86850 RBC ANTIBODY SCREEN: CPT | Performed by: EMERGENCY MEDICINE

## 2023-02-28 PROCEDURE — 85025 COMPLETE CBC W/AUTO DIFF WBC: CPT | Performed by: EMERGENCY MEDICINE

## 2023-02-28 PROCEDURE — 99285 EMERGENCY DEPT VISIT HI MDM: CPT | Mod: 25

## 2023-02-28 PROCEDURE — 87086 URINE CULTURE/COLONY COUNT: CPT | Performed by: STUDENT IN AN ORGANIZED HEALTH CARE EDUCATION/TRAINING PROGRAM

## 2023-02-28 PROCEDURE — 74177 CT ABD & PELVIS W/CONTRAST: CPT

## 2023-02-28 RX ORDER — HYDROMORPHONE HCL IN WATER/PF 6 MG/30 ML
0.4 PATIENT CONTROLLED ANALGESIA SYRINGE INTRAVENOUS
Status: DISCONTINUED | OUTPATIENT
Start: 2023-02-28 | End: 2023-03-01 | Stop reason: HOSPADM

## 2023-02-28 RX ORDER — LIDOCAINE 40 MG/G
CREAM TOPICAL
Status: DISCONTINUED | OUTPATIENT
Start: 2023-02-28 | End: 2023-03-01 | Stop reason: HOSPADM

## 2023-02-28 RX ORDER — ONDANSETRON 2 MG/ML
4 INJECTION INTRAMUSCULAR; INTRAVENOUS EVERY 6 HOURS PRN
Status: DISCONTINUED | OUTPATIENT
Start: 2023-02-28 | End: 2023-03-01 | Stop reason: HOSPADM

## 2023-02-28 RX ORDER — DOCUSATE SODIUM 100 MG/1
100 CAPSULE, LIQUID FILLED ORAL 2 TIMES DAILY PRN
Status: DISCONTINUED | OUTPATIENT
Start: 2023-02-28 | End: 2023-03-01 | Stop reason: HOSPADM

## 2023-02-28 RX ORDER — NICOTINE 21 MG/24HR
1 PATCH, TRANSDERMAL 24 HOURS TRANSDERMAL EVERY 24 HOURS
COMMUNITY
End: 2023-03-01

## 2023-02-28 RX ORDER — FAMOTIDINE 20 MG/1
20 TABLET, FILM COATED ORAL 2 TIMES DAILY
Status: DISCONTINUED | OUTPATIENT
Start: 2023-02-28 | End: 2023-03-01 | Stop reason: HOSPADM

## 2023-02-28 RX ORDER — KETOROLAC TROMETHAMINE 15 MG/ML
15 INJECTION, SOLUTION INTRAMUSCULAR; INTRAVENOUS EVERY 6 HOURS
Status: DISCONTINUED | OUTPATIENT
Start: 2023-03-01 | End: 2023-03-01 | Stop reason: HOSPADM

## 2023-02-28 RX ORDER — OXYCODONE HYDROCHLORIDE 5 MG/1
10 TABLET ORAL EVERY 4 HOURS PRN
Status: DISCONTINUED | OUTPATIENT
Start: 2023-02-28 | End: 2023-03-01 | Stop reason: HOSPADM

## 2023-02-28 RX ORDER — HYDROXYZINE HYDROCHLORIDE 25 MG/1
50 TABLET, FILM COATED ORAL EVERY 6 HOURS PRN
Status: DISCONTINUED | OUTPATIENT
Start: 2023-02-28 | End: 2023-03-01 | Stop reason: HOSPADM

## 2023-02-28 RX ORDER — PROCHLORPERAZINE MALEATE 10 MG
10 TABLET ORAL EVERY 6 HOURS PRN
Status: DISCONTINUED | OUTPATIENT
Start: 2023-02-28 | End: 2023-03-01 | Stop reason: HOSPADM

## 2023-02-28 RX ORDER — PIPERACILLIN SODIUM, TAZOBACTAM SODIUM 3; .375 G/15ML; G/15ML
3.38 INJECTION, POWDER, LYOPHILIZED, FOR SOLUTION INTRAVENOUS ONCE
Status: COMPLETED | OUTPATIENT
Start: 2023-02-28 | End: 2023-02-28

## 2023-02-28 RX ORDER — ONDANSETRON 4 MG/1
4 TABLET, ORALLY DISINTEGRATING ORAL EVERY 6 HOURS PRN
Status: DISCONTINUED | OUTPATIENT
Start: 2023-02-28 | End: 2023-03-01 | Stop reason: HOSPADM

## 2023-02-28 RX ORDER — IOPAMIDOL 755 MG/ML
100 INJECTION, SOLUTION INTRAVASCULAR ONCE
Status: COMPLETED | OUTPATIENT
Start: 2023-02-28 | End: 2023-02-28

## 2023-02-28 RX ORDER — GABAPENTIN 100 MG/1
200 CAPSULE ORAL
Status: ON HOLD | COMMUNITY
End: 2023-03-02

## 2023-02-28 RX ORDER — ONDANSETRON 8 MG/1
8 TABLET, ORALLY DISINTEGRATING ORAL 2 TIMES DAILY PRN
COMMUNITY

## 2023-02-28 RX ORDER — OXYCODONE HYDROCHLORIDE 5 MG/1
5 TABLET ORAL EVERY 4 HOURS PRN
Status: DISCONTINUED | OUTPATIENT
Start: 2023-02-28 | End: 2023-03-01 | Stop reason: HOSPADM

## 2023-02-28 RX ORDER — OXYCODONE HYDROCHLORIDE 5 MG/1
10 TABLET ORAL ONCE
Status: COMPLETED | OUTPATIENT
Start: 2023-02-28 | End: 2023-02-28

## 2023-02-28 RX ORDER — POLYETHYLENE GLYCOL 3350 17 G/17G
17 POWDER, FOR SOLUTION ORAL DAILY PRN
Status: DISCONTINUED | OUTPATIENT
Start: 2023-03-01 | End: 2023-03-01 | Stop reason: HOSPADM

## 2023-02-28 RX ORDER — ACETAMINOPHEN 325 MG/1
975 TABLET ORAL EVERY 6 HOURS
Status: DISCONTINUED | OUTPATIENT
Start: 2023-03-01 | End: 2023-03-01 | Stop reason: HOSPADM

## 2023-02-28 RX ORDER — AMOXICILLIN 250 MG
1 CAPSULE ORAL 2 TIMES DAILY
Status: DISCONTINUED | OUTPATIENT
Start: 2023-02-28 | End: 2023-03-01 | Stop reason: HOSPADM

## 2023-02-28 RX ORDER — HYDROMORPHONE HCL IN WATER/PF 6 MG/30 ML
0.2 PATIENT CONTROLLED ANALGESIA SYRINGE INTRAVENOUS
Status: DISCONTINUED | OUTPATIENT
Start: 2023-02-28 | End: 2023-03-01 | Stop reason: HOSPADM

## 2023-02-28 RX ORDER — GABAPENTIN 300 MG/1
300-600 CAPSULE ORAL 2 TIMES DAILY
COMMUNITY

## 2023-02-28 RX ORDER — ACETAMINOPHEN 325 MG/1
650 TABLET ORAL EVERY 6 HOURS
Status: DISCONTINUED | OUTPATIENT
Start: 2023-03-04 | End: 2023-03-01 | Stop reason: HOSPADM

## 2023-02-28 RX ORDER — BISACODYL 10 MG
10 SUPPOSITORY, RECTAL RECTAL DAILY PRN
Status: DISCONTINUED | OUTPATIENT
Start: 2023-02-28 | End: 2023-03-01 | Stop reason: HOSPADM

## 2023-02-28 RX ORDER — PIPERACILLIN SODIUM, TAZOBACTAM SODIUM 3; .375 G/15ML; G/15ML
3.38 INJECTION, POWDER, LYOPHILIZED, FOR SOLUTION INTRAVENOUS EVERY 8 HOURS
Status: DISCONTINUED | OUTPATIENT
Start: 2023-03-01 | End: 2023-03-01 | Stop reason: HOSPADM

## 2023-02-28 RX ADMIN — PIPERACILLIN AND TAZOBACTAM 3.38 G: 3; .375 INJECTION, POWDER, LYOPHILIZED, FOR SOLUTION INTRAVENOUS at 21:06

## 2023-02-28 RX ADMIN — IOPAMIDOL 100 ML: 755 INJECTION, SOLUTION INTRAVENOUS at 14:46

## 2023-02-28 RX ADMIN — SENNOSIDES AND DOCUSATE SODIUM 1 TABLET: 50; 8.6 TABLET ORAL at 21:07

## 2023-02-28 RX ADMIN — OXYCODONE HYDROCHLORIDE 10 MG: 5 TABLET ORAL at 21:07

## 2023-02-28 RX ADMIN — FAMOTIDINE 20 MG: 20 TABLET, FILM COATED ORAL at 21:07

## 2023-02-28 ASSESSMENT — ACTIVITIES OF DAILY LIVING (ADL): ADLS_ACUITY_SCORE: 35

## 2023-02-28 NOTE — ED NOTES
Expected Patient Referral to ED  4:23 PM    Referring Clinic/Provider:  casandra Yu    Reason for referral/Clinical facts:  31 y/o f  10cm pelvic hematoma  6d post op hyst  Has fever/pain  Needs admit for IV abx  Npo  IR consult to drain    Recommendations provided:  Send to ED for further evaluation    Caller was informed that this institution does possess the capabilities and/or resources to provide for patient and should be transferred to our facility.    Discussed that if direct admit is sought and any hurdles are encountered, this ED would be happy to see the patient and evaluate.    Informed caller that recommendations provided are recommendations based only on the facts provided and that they responsible to accept or reject the advice, or to seek a formal in person consultation as needed and that this ED will see/treat patient should they arrive.      Nerissa Lozano MD  [facility] EMERGENCY DEPARTMENT  [address] [place] [id number]  [phone]       Nerissa Lozano MD  02/28/23 0873

## 2023-03-01 ENCOUNTER — APPOINTMENT (OUTPATIENT)
Dept: CT IMAGING | Facility: HOSPITAL | Age: 33
End: 2023-03-01
Attending: RADIOLOGY
Payer: COMMERCIAL

## 2023-03-01 VITALS
DIASTOLIC BLOOD PRESSURE: 66 MMHG | SYSTOLIC BLOOD PRESSURE: 110 MMHG | OXYGEN SATURATION: 95 % | HEART RATE: 87 BPM | TEMPERATURE: 98.2 F | RESPIRATION RATE: 18 BRPM

## 2023-03-01 LAB
BASOPHILS # BLD AUTO: 0 10E3/UL (ref 0–0.2)
BASOPHILS NFR BLD AUTO: 1 %
EOSINOPHIL # BLD AUTO: 0.2 10E3/UL (ref 0–0.7)
EOSINOPHIL NFR BLD AUTO: 3 %
ERYTHROCYTE [DISTWIDTH] IN BLOOD BY AUTOMATED COUNT: 14.9 % (ref 10–15)
GLUCOSE BLDC GLUCOMTR-MCNC: 83 MG/DL (ref 70–99)
HCT VFR BLD AUTO: 25.9 % (ref 35–47)
HGB BLD-MCNC: 8 G/DL (ref 11.7–15.7)
IMM GRANULOCYTES # BLD: 0.1 10E3/UL
IMM GRANULOCYTES NFR BLD: 1 %
LYMPHOCYTES # BLD AUTO: 1.5 10E3/UL (ref 0.8–5.3)
LYMPHOCYTES NFR BLD AUTO: 23 %
MCH RBC QN AUTO: 26.7 PG (ref 26.5–33)
MCHC RBC AUTO-ENTMCNC: 30.9 G/DL (ref 31.5–36.5)
MCV RBC AUTO: 86 FL (ref 78–100)
MONOCYTES # BLD AUTO: 0.4 10E3/UL (ref 0–1.3)
MONOCYTES NFR BLD AUTO: 6 %
NEUTROPHILS # BLD AUTO: 4.5 10E3/UL (ref 1.6–8.3)
NEUTROPHILS NFR BLD AUTO: 66 %
NRBC # BLD AUTO: 0 10E3/UL
NRBC BLD AUTO-RTO: 0 /100
PLATELET # BLD AUTO: 273 10E3/UL (ref 150–450)
RBC # BLD AUTO: 3 10E6/UL (ref 3.8–5.2)
WBC # BLD AUTO: 6.6 10E3/UL (ref 4–11)

## 2023-03-01 PROCEDURE — 250N000011 HC RX IP 250 OP 636: Performed by: RADIOLOGY

## 2023-03-01 PROCEDURE — 36415 COLL VENOUS BLD VENIPUNCTURE: CPT | Performed by: OBSTETRICS & GYNECOLOGY

## 2023-03-01 PROCEDURE — 77012 CT SCAN FOR NEEDLE BIOPSY: CPT

## 2023-03-01 PROCEDURE — 87070 CULTURE OTHR SPECIMN AEROBIC: CPT | Performed by: RADIOLOGY

## 2023-03-01 PROCEDURE — 85025 COMPLETE CBC W/AUTO DIFF WBC: CPT | Performed by: OBSTETRICS & GYNECOLOGY

## 2023-03-01 PROCEDURE — 87075 CULTR BACTERIA EXCEPT BLOOD: CPT | Performed by: RADIOLOGY

## 2023-03-01 PROCEDURE — 99152 MOD SED SAME PHYS/QHP 5/>YRS: CPT

## 2023-03-01 PROCEDURE — 250N000011 HC RX IP 250 OP 636: Performed by: STUDENT IN AN ORGANIZED HEALTH CARE EDUCATION/TRAINING PROGRAM

## 2023-03-01 PROCEDURE — 82962 GLUCOSE BLOOD TEST: CPT

## 2023-03-01 PROCEDURE — 0W9J3ZZ DRAINAGE OF PELVIC CAVITY, PERCUTANEOUS APPROACH: ICD-10-PCS | Performed by: RADIOLOGY

## 2023-03-01 PROCEDURE — 250N000013 HC RX MED GY IP 250 OP 250 PS 637: Performed by: STUDENT IN AN ORGANIZED HEALTH CARE EDUCATION/TRAINING PROGRAM

## 2023-03-01 RX ORDER — NALOXONE HYDROCHLORIDE 0.4 MG/ML
0.4 INJECTION, SOLUTION INTRAMUSCULAR; INTRAVENOUS; SUBCUTANEOUS
Status: DISCONTINUED | OUTPATIENT
Start: 2023-03-01 | End: 2023-03-01 | Stop reason: HOSPADM

## 2023-03-01 RX ORDER — NALOXONE HYDROCHLORIDE 0.4 MG/ML
0.2 INJECTION, SOLUTION INTRAMUSCULAR; INTRAVENOUS; SUBCUTANEOUS
Status: DISCONTINUED | OUTPATIENT
Start: 2023-03-01 | End: 2023-03-01 | Stop reason: HOSPADM

## 2023-03-01 RX ORDER — FLUMAZENIL 0.1 MG/ML
0.2 INJECTION, SOLUTION INTRAVENOUS
Status: DISCONTINUED | OUTPATIENT
Start: 2023-03-01 | End: 2023-03-01 | Stop reason: HOSPADM

## 2023-03-01 RX ORDER — FENTANYL CITRATE 50 UG/ML
25-50 INJECTION, SOLUTION INTRAMUSCULAR; INTRAVENOUS EVERY 5 MIN PRN
Status: DISCONTINUED | OUTPATIENT
Start: 2023-03-01 | End: 2023-03-01 | Stop reason: HOSPADM

## 2023-03-01 RX ORDER — OXYCODONE HYDROCHLORIDE 5 MG/1
10 TABLET ORAL EVERY 4 HOURS PRN
Qty: 30 TABLET | Refills: 0 | Status: ON HOLD | OUTPATIENT
Start: 2023-03-01 | End: 2023-03-03

## 2023-03-01 RX ADMIN — SENNOSIDES AND DOCUSATE SODIUM 1 TABLET: 50; 8.6 TABLET ORAL at 07:50

## 2023-03-01 RX ADMIN — MIDAZOLAM HYDROCHLORIDE 2 MG: 1 INJECTION, SOLUTION INTRAMUSCULAR; INTRAVENOUS at 08:42

## 2023-03-01 RX ADMIN — MIDAZOLAM HYDROCHLORIDE 1 MG: 1 INJECTION, SOLUTION INTRAMUSCULAR; INTRAVENOUS at 08:47

## 2023-03-01 RX ADMIN — KETOROLAC TROMETHAMINE 15 MG: 15 INJECTION, SOLUTION INTRAMUSCULAR; INTRAVENOUS at 02:10

## 2023-03-01 RX ADMIN — PIPERACILLIN AND TAZOBACTAM 3.38 G: 3; .375 INJECTION, POWDER, LYOPHILIZED, FOR SOLUTION INTRAVENOUS at 02:11

## 2023-03-01 RX ADMIN — PIPERACILLIN AND TAZOBACTAM 3.38 G: 3; .375 INJECTION, POWDER, LYOPHILIZED, FOR SOLUTION INTRAVENOUS at 09:56

## 2023-03-01 RX ADMIN — ACETAMINOPHEN 975 MG: 325 TABLET ORAL at 02:27

## 2023-03-01 RX ADMIN — HYDROMORPHONE HYDROCHLORIDE 0.4 MG: 0.2 INJECTION, SOLUTION INTRAMUSCULAR; INTRAVENOUS; SUBCUTANEOUS at 06:21

## 2023-03-01 RX ADMIN — ACETAMINOPHEN 975 MG: 325 TABLET ORAL at 07:50

## 2023-03-01 RX ADMIN — FAMOTIDINE 20 MG: 20 TABLET, FILM COATED ORAL at 07:51

## 2023-03-01 RX ADMIN — KETOROLAC TROMETHAMINE 15 MG: 15 INJECTION, SOLUTION INTRAMUSCULAR; INTRAVENOUS at 07:51

## 2023-03-01 RX ADMIN — FENTANYL CITRATE 50 MCG: 50 INJECTION, SOLUTION INTRAMUSCULAR; INTRAVENOUS at 08:48

## 2023-03-01 RX ADMIN — OXYCODONE HYDROCHLORIDE 10 MG: 5 TABLET ORAL at 11:12

## 2023-03-01 RX ADMIN — OXYCODONE HYDROCHLORIDE 5 MG: 5 TABLET ORAL at 04:41

## 2023-03-01 RX ADMIN — FENTANYL CITRATE 50 MCG: 50 INJECTION, SOLUTION INTRAMUSCULAR; INTRAVENOUS at 08:44

## 2023-03-01 ASSESSMENT — ACTIVITIES OF DAILY LIVING (ADL)
ADLS_ACUITY_SCORE: 35

## 2023-03-01 NOTE — PHARMACY-ADMISSION MEDICATION HISTORY
Pharmacy Note - Admission Medication History    Pertinent Provider Information: none     ______________________________________________________________________    Prior To Admission (PTA) med list completed and updated in EMR.       PTA Med List   Medication Sig Note Last Dose     acetaminophen (TYLENOL) 325 MG tablet Take 325-650 mg by mouth every 6 hours as needed for mild pain  2/28/2023 at 230pm     gabapentin (NEURONTIN) 100 MG capsule Take 200 mg by mouth daily at 2 pm 300mg qam and 200mg qafternoon and 600mg qhs  2/28/2023 at afternoon     gabapentin (NEURONTIN) 300 MG capsule Take 300-600 mg by mouth 2 times daily 300mg qam and 200mg qafternoon and 600mg qhs  2/28/2023 at am dose     ibuprofen (ADVIL/MOTRIN) 800 MG tablet Take 1 tablet (800 mg) by mouth every 6 hours as needed for other (mild and/or inflammatory pain)  2/28/2023 at 230pm     magnesium 250 MG tablet Take 1 tablet by mouth At Bedtime Restless legs  2/27/2023 at pm     ondansetron (ZOFRAN ODT) 8 MG ODT tab Take 8 mg by mouth 2 times daily as needed for nausea 2/28/2023: 2/18/23- took her last dose on Sunday. 2/26/2023     oxyCODONE (ROXICODONE) 5 MG tablet Take 1-2 tablets (5-10 mg) by mouth every 4 hours as needed for moderate to severe pain (Patient taking differently: Take 10 mg by mouth every 4 hours as needed for moderate to severe pain) 2/28/2023: 2/28/23- recent increase to 10mg q4hprn 2/28/2023 at 230pm     Vitamin D3 (CHOLECALCIFEROL) 125 MCG (5000 UT) tablet Take 125 mcg by mouth At Bedtime  2/27/2023 at pm       Information source(s): Patient, Clinic records, Hospital records and [facility]  Method of interview communication: in-person    Summary of Changes to PTA Med List  New: nicotine (not started)  Discontinued: omeprazole,   Changed: vit d weekly to daily, gabapentin adjusted, prn oxycodone 5mg q12h prn to 10mg q4hprn    Patient was asked about OTC/herbal products specifically.  PTA med list reflects this.    In  the past week, patient estimated taking medication this percent of the time:  greater than 90%.    Medication Affordability:       Allergies were reviewed, assessed, and updated with the patient.      Patient does not use any multi-dose medications prior to admission.    The information provided in this note is only as accurate as the sources available at the time of the update(s).    Thank you for the opportunity to participate in the care of this patient.    Stuart Sanchez [facility]  2/28/2023 9:29 PM
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
## 2023-03-01 NOTE — ED TRIAGE NOTES
patient reports a hysterectomy less than one week ago and on CT noted a 10 cm blood clot near bladder and patient was informed by OB to come here for drainage of blood clot and IV antibiotics.

## 2023-03-01 NOTE — PROCEDURES
[facility]    Procedure: Imaging Procedure Note    Date/Time: 3/1/2023 8:54 AM  Performed by: Serge Mcintyre MD  Authorized by: Serge Mcintyre MD       UNIVERSAL PROTOCOL   Site Marked: Yes  Prior Images Obtained and Reviewed:  Yes  Required items: Required blood products, implants, devices and special equipment available    Patient identity confirmed:  Verbally with patient  Patient was reevaluated immediately before administering moderate or deep sedation or anesthesia  Confirmation Checklist:  Patient's identity using two indicators, relevant allergies, procedure was appropriate and matched the consent or emergent situation and correct equipment/implants were available  Time out: Immediately prior to the procedure a time out was called    Universal Protocol: the Joint Commission Universal Protocol was followed    Preparation: Patient was prepped and draped in usual sterile fashion      SEDATION  Patient Sedated: Yes    Vital signs: Vital signs monitored during sedation    See dictated procedure note for full details.    PROCEDURE  Describe Procedure: Pelvic hematoma aspiration.  10cc thick old blood.  Sent for cultures  Patient Tolerance:  Patient tolerated the procedure well with no immediate complications  Length of time physician/provider present for 1:1 monitoring during sedation: 15

## 2023-03-01 NOTE — PRE-PROCEDURE
GENERAL PRE-PROCEDURE:   Procedure:  Pelvic hematoma aspiration  Date/Time:  3/1/2023 8:14 AM    Written consent obtained?: Yes    Risks and benefits: Risks, benefits and alternatives were discussed    Consent given by:  Patient  Patient states understanding of procedure being performed: Yes    Patient's understanding of procedure matches consent: Yes    Procedure consent matches procedure scheduled: Yes    Expected level of sedation:  Moderate  Appropriately NPO:  Yes  Mallampati  :  Grade 1- soft palate, uvula, tonsillar pillars, and posterior pharyngeal wall visible  Lungs:  Lungs clear with good breath sounds bilaterally  Heart:  Normal heart sounds and rate  History & Physical reviewed:  History and physical reviewed and no updates needed  Statement of review:  I have reviewed the lab findings, diagnostic data, medications, and the plan for sedation
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
## 2023-03-01 NOTE — PROVIDER NOTIFICATION
Pt returned to ER room 34 post aspiration procedure awake and alert.  BP placed Q15 with Pulse ox.  Pt denies any questions and report given to receiving RN without questions or concerns.

## 2023-03-01 NOTE — CONSULTS
[facility] IR consult.    I reviewed CT 2/28/23.  Large post-op pelvic hematoma anterior to bladder causing mass effect on bladder.  This has a tiny amount of air consistent with recent surgery.  Hematomas do not drain well but we could aspirate and send for cultures.  I will place an order for this.

## 2023-03-02 ENCOUNTER — ANESTHESIA (OUTPATIENT)
Dept: SURGERY | Facility: HOSPITAL | Age: 33
End: 2023-03-02
Payer: COMMERCIAL

## 2023-03-02 ENCOUNTER — MEDICAL CORRESPONDENCE (OUTPATIENT)
Dept: HEALTH INFORMATION MANAGEMENT | Facility: CLINIC | Age: 33
End: 2023-03-02

## 2023-03-02 ENCOUNTER — HOSPITAL ENCOUNTER (OUTPATIENT)
Facility: HOSPITAL | Age: 33
Discharge: HOME | End: 2023-03-03
Attending: OBSTETRICS & GYNECOLOGY | Admitting: OBSTETRICS & GYNECOLOGY
Payer: COMMERCIAL

## 2023-03-02 ENCOUNTER — ANESTHESIA EVENT (OUTPATIENT)
Dept: SURGERY | Facility: HOSPITAL | Age: 33
End: 2023-03-02
Payer: COMMERCIAL

## 2023-03-02 DIAGNOSIS — N81.4 UTEROVAGINAL PROLAPSE: ICD-10-CM

## 2023-03-02 LAB
BACTERIA UR CULT: NO GROWTH
BACTERIA UR CULT: NO GROWTH

## 2023-03-02 PROCEDURE — 250N000011 HC RX IP 250 OP 636: Performed by: ANESTHESIOLOGY

## 2023-03-02 PROCEDURE — 999N000141 HC STATISTIC PRE-PROCEDURE NURSING ASSESSMENT: Performed by: OBSTETRICS & GYNECOLOGY

## 2023-03-02 PROCEDURE — 360N000080 HC SURGERY LEVEL 7, PER MIN: Performed by: OBSTETRICS & GYNECOLOGY

## 2023-03-02 PROCEDURE — 710N000010 HC RECOVERY PHASE 1, LEVEL 2, PER MIN: Performed by: OBSTETRICS & GYNECOLOGY

## 2023-03-02 PROCEDURE — 250N000011 HC RX IP 250 OP 636

## 2023-03-02 PROCEDURE — 250N000013 HC RX MED GY IP 250 OP 250 PS 637: Performed by: ANESTHESIOLOGY

## 2023-03-02 PROCEDURE — 250N000011 HC RX IP 250 OP 636: Performed by: OBSTETRICS & GYNECOLOGY

## 2023-03-02 PROCEDURE — 258N000003 HC RX IP 258 OP 636: Performed by: OBSTETRICS & GYNECOLOGY

## 2023-03-02 PROCEDURE — 250N000009 HC RX 250: Performed by: ANESTHESIOLOGY

## 2023-03-02 PROCEDURE — 370N000017 HC ANESTHESIA TECHNICAL FEE, PER MIN: Performed by: OBSTETRICS & GYNECOLOGY

## 2023-03-02 PROCEDURE — 250N000013 HC RX MED GY IP 250 OP 250 PS 637: Performed by: OBSTETRICS & GYNECOLOGY

## 2023-03-02 PROCEDURE — 258N000003 HC RX IP 258 OP 636: Performed by: ANESTHESIOLOGY

## 2023-03-02 PROCEDURE — 250N000025 HC SEVOFLURANE, PER MIN: Performed by: OBSTETRICS & GYNECOLOGY

## 2023-03-02 PROCEDURE — 250N000012 HC RX MED GY IP 250 OP 636 PS 637: Performed by: ANESTHESIOLOGY

## 2023-03-02 PROCEDURE — 272N000001 HC OR GENERAL SUPPLY STERILE: Performed by: OBSTETRICS & GYNECOLOGY

## 2023-03-02 RX ORDER — HYDROMORPHONE HCL IN WATER/PF 6 MG/30 ML
0.2 PATIENT CONTROLLED ANALGESIA SYRINGE INTRAVENOUS
Status: DISCONTINUED | OUTPATIENT
Start: 2023-03-02 | End: 2023-03-03 | Stop reason: HOSPADM

## 2023-03-02 RX ORDER — BUPIVACAINE HYDROCHLORIDE 2.5 MG/ML
INJECTION, SOLUTION INFILTRATION; PERINEURAL PRN
Status: DISCONTINUED | OUTPATIENT
Start: 2023-03-02 | End: 2023-03-02 | Stop reason: HOSPADM

## 2023-03-02 RX ORDER — LIDOCAINE HYDROCHLORIDE 10 MG/ML
INJECTION, SOLUTION INFILTRATION; PERINEURAL PRN
Status: DISCONTINUED | OUTPATIENT
Start: 2023-03-02 | End: 2023-03-02

## 2023-03-02 RX ORDER — AMOXICILLIN 250 MG
1 CAPSULE ORAL 2 TIMES DAILY
Status: DISCONTINUED | OUTPATIENT
Start: 2023-03-02 | End: 2023-03-03 | Stop reason: HOSPADM

## 2023-03-02 RX ORDER — PROPOFOL 10 MG/ML
INJECTION, EMULSION INTRAVENOUS PRN
Status: DISCONTINUED | OUTPATIENT
Start: 2023-03-02 | End: 2023-03-02

## 2023-03-02 RX ORDER — BISACODYL 10 MG
10 SUPPOSITORY, RECTAL RECTAL DAILY PRN
Status: DISCONTINUED | OUTPATIENT
Start: 2023-03-02 | End: 2023-03-03 | Stop reason: HOSPADM

## 2023-03-02 RX ORDER — GABAPENTIN 100 MG/1
200 CAPSULE ORAL DAILY
COMMUNITY

## 2023-03-02 RX ORDER — SODIUM CHLORIDE, SODIUM LACTATE, POTASSIUM CHLORIDE, AND CALCIUM CHLORIDE .6; .31; .03; .02 G/100ML; G/100ML; G/100ML; G/100ML
IRRIGANT IRRIGATION PRN
Status: DISCONTINUED | OUTPATIENT
Start: 2023-03-02 | End: 2023-03-02 | Stop reason: HOSPADM

## 2023-03-02 RX ORDER — APREPITANT 40 MG/1
40 CAPSULE ORAL ONCE
Status: COMPLETED | OUTPATIENT
Start: 2023-03-02 | End: 2023-03-02

## 2023-03-02 RX ORDER — HYDROMORPHONE HYDROCHLORIDE 1 MG/ML
0.5 INJECTION, SOLUTION INTRAMUSCULAR; INTRAVENOUS; SUBCUTANEOUS
Status: DISCONTINUED | OUTPATIENT
Start: 2023-03-02 | End: 2023-03-03 | Stop reason: HOSPADM

## 2023-03-02 RX ORDER — CEFAZOLIN SODIUM/WATER 2 G/20 ML
SYRINGE (ML) INTRAVENOUS
Status: DISCONTINUED
Start: 2023-03-02 | End: 2023-03-02 | Stop reason: HOSPADM

## 2023-03-02 RX ORDER — CEFAZOLIN SODIUM/WATER 2 G/20 ML
2 SYRINGE (ML) INTRAVENOUS ONCE
Status: COMPLETED | OUTPATIENT
Start: 2023-03-02 | End: 2023-03-02

## 2023-03-02 RX ORDER — PROPOFOL 10 MG/ML
INJECTION, EMULSION INTRAVENOUS CONTINUOUS PRN
Status: DISCONTINUED | OUTPATIENT
Start: 2023-03-02 | End: 2023-03-02

## 2023-03-02 RX ORDER — KETAMINE HYDROCHLORIDE 10 MG/ML
INJECTION INTRAMUSCULAR; INTRAVENOUS PRN
Status: DISCONTINUED | OUTPATIENT
Start: 2023-03-02 | End: 2023-03-02

## 2023-03-02 RX ORDER — OXYCODONE HYDROCHLORIDE 5 MG/1
15 TABLET ORAL EVERY 4 HOURS PRN
Status: DISCONTINUED | OUTPATIENT
Start: 2023-03-02 | End: 2023-03-03 | Stop reason: HOSPADM

## 2023-03-02 RX ORDER — FENTANYL CITRATE 50 UG/ML
INJECTION, SOLUTION INTRAMUSCULAR; INTRAVENOUS PRN
Status: DISCONTINUED | OUTPATIENT
Start: 2023-03-02 | End: 2023-03-02

## 2023-03-02 RX ORDER — NALOXONE HYDROCHLORIDE 0.4 MG/ML
0.2 INJECTION, SOLUTION INTRAMUSCULAR; INTRAVENOUS; SUBCUTANEOUS
Status: DISCONTINUED | OUTPATIENT
Start: 2023-03-02 | End: 2023-03-03 | Stop reason: HOSPADM

## 2023-03-02 RX ORDER — MAGNESIUM SULFATE 4 G/50ML
4 INJECTION INTRAVENOUS ONCE
Status: COMPLETED | OUTPATIENT
Start: 2023-03-02 | End: 2023-03-02

## 2023-03-02 RX ORDER — ALBUTEROL SULFATE 0.83 MG/ML
2.5 SOLUTION RESPIRATORY (INHALATION) EVERY 4 HOURS PRN
Status: DISCONTINUED | OUTPATIENT
Start: 2023-03-02 | End: 2023-03-02

## 2023-03-02 RX ORDER — OXYCODONE HYDROCHLORIDE 5 MG/1
10 TABLET ORAL EVERY 4 HOURS PRN
Status: DISCONTINUED | OUTPATIENT
Start: 2023-03-02 | End: 2023-03-03 | Stop reason: HOSPADM

## 2023-03-02 RX ORDER — FENTANYL CITRATE 50 UG/ML
25 INJECTION, SOLUTION INTRAMUSCULAR; INTRAVENOUS EVERY 5 MIN PRN
Status: DISCONTINUED | OUTPATIENT
Start: 2023-03-02 | End: 2023-03-02 | Stop reason: HOSPADM

## 2023-03-02 RX ORDER — NALOXONE HYDROCHLORIDE 0.4 MG/ML
0.4 INJECTION, SOLUTION INTRAMUSCULAR; INTRAVENOUS; SUBCUTANEOUS
Status: DISCONTINUED | OUTPATIENT
Start: 2023-03-02 | End: 2023-03-03 | Stop reason: HOSPADM

## 2023-03-02 RX ORDER — GABAPENTIN 300 MG/1
300 CAPSULE ORAL EVERY MORNING
Status: DISCONTINUED | OUTPATIENT
Start: 2023-03-03 | End: 2023-03-03 | Stop reason: HOSPADM

## 2023-03-02 RX ORDER — ONDANSETRON 2 MG/ML
INJECTION INTRAMUSCULAR; INTRAVENOUS PRN
Status: DISCONTINUED | OUTPATIENT
Start: 2023-03-02 | End: 2023-03-02

## 2023-03-02 RX ORDER — ONDANSETRON 4 MG/1
4 TABLET, ORALLY DISINTEGRATING ORAL EVERY 6 HOURS PRN
Status: DISCONTINUED | OUTPATIENT
Start: 2023-03-02 | End: 2023-03-03 | Stop reason: HOSPADM

## 2023-03-02 RX ORDER — GABAPENTIN 100 MG/1
200 CAPSULE ORAL DAILY
Status: DISCONTINUED | OUTPATIENT
Start: 2023-03-03 | End: 2023-03-03 | Stop reason: HOSPADM

## 2023-03-02 RX ORDER — ONDANSETRON 4 MG/1
4 TABLET, ORALLY DISINTEGRATING ORAL EVERY 30 MIN PRN
Status: DISCONTINUED | OUTPATIENT
Start: 2023-03-02 | End: 2023-03-02 | Stop reason: HOSPADM

## 2023-03-02 RX ORDER — ACETAMINOPHEN 325 MG/1
975 TABLET ORAL ONCE
Status: COMPLETED | OUTPATIENT
Start: 2023-03-02 | End: 2023-03-02

## 2023-03-02 RX ORDER — DEXMEDETOMIDINE HYDROCHLORIDE 4 UG/ML
INJECTION, SOLUTION INTRAVENOUS PRN
Status: DISCONTINUED | OUTPATIENT
Start: 2023-03-02 | End: 2023-03-02

## 2023-03-02 RX ORDER — IBUPROFEN 800 MG/1
800 TABLET, FILM COATED ORAL EVERY 6 HOURS
Status: DISCONTINUED | OUTPATIENT
Start: 2023-03-02 | End: 2023-03-03 | Stop reason: HOSPADM

## 2023-03-02 RX ORDER — HALOPERIDOL 5 MG/ML
1 INJECTION INTRAMUSCULAR
Status: DISCONTINUED | OUTPATIENT
Start: 2023-03-02 | End: 2023-03-02 | Stop reason: HOSPADM

## 2023-03-02 RX ORDER — LIDOCAINE 40 MG/G
CREAM TOPICAL
Status: DISCONTINUED | OUTPATIENT
Start: 2023-03-02 | End: 2023-03-03 | Stop reason: HOSPADM

## 2023-03-02 RX ORDER — GLYCOPYRROLATE 0.2 MG/ML
INJECTION, SOLUTION INTRAMUSCULAR; INTRAVENOUS PRN
Status: DISCONTINUED | OUTPATIENT
Start: 2023-03-02 | End: 2023-03-02

## 2023-03-02 RX ORDER — LEVALBUTEROL INHALATION SOLUTION 1.25 MG/3ML
1.25 SOLUTION RESPIRATORY (INHALATION) EVERY 4 HOURS PRN
Status: DISCONTINUED | OUTPATIENT
Start: 2023-03-02 | End: 2023-03-02 | Stop reason: HOSPADM

## 2023-03-02 RX ORDER — ONDANSETRON 2 MG/ML
4 INJECTION INTRAMUSCULAR; INTRAVENOUS EVERY 30 MIN PRN
Status: DISCONTINUED | OUTPATIENT
Start: 2023-03-02 | End: 2023-03-02 | Stop reason: HOSPADM

## 2023-03-02 RX ORDER — ONDANSETRON 2 MG/ML
4 INJECTION INTRAMUSCULAR; INTRAVENOUS EVERY 6 HOURS PRN
Status: DISCONTINUED | OUTPATIENT
Start: 2023-03-02 | End: 2023-03-03 | Stop reason: HOSPADM

## 2023-03-02 RX ORDER — ONDANSETRON 4 MG/1
8 TABLET, ORALLY DISINTEGRATING ORAL 2 TIMES DAILY PRN
Status: DISCONTINUED | OUTPATIENT
Start: 2023-03-02 | End: 2023-03-02 | Stop reason: ALTCHOICE

## 2023-03-02 RX ORDER — LABETALOL HYDROCHLORIDE 5 MG/ML
10 INJECTION, SOLUTION INTRAVENOUS
Status: DISCONTINUED | OUTPATIENT
Start: 2023-03-02 | End: 2023-03-02 | Stop reason: HOSPADM

## 2023-03-02 RX ORDER — ACETAMINOPHEN 325 MG/1
975 TABLET ORAL EVERY 6 HOURS
Status: DISCONTINUED | OUTPATIENT
Start: 2023-03-02 | End: 2023-03-03 | Stop reason: HOSPADM

## 2023-03-02 RX ORDER — KETOROLAC TROMETHAMINE 15 MG/ML
15 INJECTION, SOLUTION INTRAMUSCULAR; INTRAVENOUS EVERY 6 HOURS
Status: DISCONTINUED | OUTPATIENT
Start: 2023-03-02 | End: 2023-03-03 | Stop reason: HOSPADM

## 2023-03-02 RX ORDER — KETOROLAC TROMETHAMINE 30 MG/ML
INJECTION, SOLUTION INTRAMUSCULAR; INTRAVENOUS PRN
Status: DISCONTINUED | OUTPATIENT
Start: 2023-03-02 | End: 2023-03-02

## 2023-03-02 RX ORDER — SODIUM CHLORIDE, SODIUM LACTATE, POTASSIUM CHLORIDE, CALCIUM CHLORIDE 600; 310; 30; 20 MG/100ML; MG/100ML; MG/100ML; MG/100ML
INJECTION, SOLUTION INTRAVENOUS CONTINUOUS
Status: DISCONTINUED | OUTPATIENT
Start: 2023-03-02 | End: 2023-03-02 | Stop reason: HOSPADM

## 2023-03-02 RX ORDER — ACETAMINOPHEN 325 MG/1
650 TABLET ORAL EVERY 6 HOURS
Status: DISCONTINUED | OUTPATIENT
Start: 2023-03-05 | End: 2023-03-03 | Stop reason: HOSPADM

## 2023-03-02 RX ORDER — KETAMINE HYDROCHLORIDE 10 MG/ML
INJECTION INTRAMUSCULAR; INTRAVENOUS
Status: DISCONTINUED
Start: 2023-03-02 | End: 2023-03-02 | Stop reason: HOSPADM

## 2023-03-02 RX ORDER — SODIUM CHLORIDE, SODIUM LACTATE, POTASSIUM CHLORIDE, CALCIUM CHLORIDE 600; 310; 30; 20 MG/100ML; MG/100ML; MG/100ML; MG/100ML
INJECTION, SOLUTION INTRAVENOUS CONTINUOUS
Status: DISCONTINUED | OUTPATIENT
Start: 2023-03-02 | End: 2023-03-03 | Stop reason: HOSPADM

## 2023-03-02 RX ORDER — MEPERIDINE HYDROCHLORIDE 25 MG/ML
12.5 INJECTION INTRAMUSCULAR; INTRAVENOUS; SUBCUTANEOUS EVERY 5 MIN PRN
Status: DISCONTINUED | OUTPATIENT
Start: 2023-03-02 | End: 2023-03-02 | Stop reason: HOSPADM

## 2023-03-02 RX ORDER — DEXAMETHASONE SODIUM PHOSPHATE 10 MG/ML
INJECTION, SOLUTION INTRAMUSCULAR; INTRAVENOUS PRN
Status: DISCONTINUED | OUTPATIENT
Start: 2023-03-02 | End: 2023-03-02

## 2023-03-02 RX ORDER — LIDOCAINE 40 MG/G
CREAM TOPICAL
Status: DISCONTINUED | OUTPATIENT
Start: 2023-03-02 | End: 2023-03-02 | Stop reason: HOSPADM

## 2023-03-02 RX ORDER — GABAPENTIN 300 MG/1
600 CAPSULE ORAL AT BEDTIME
Status: DISCONTINUED | OUTPATIENT
Start: 2023-03-02 | End: 2023-03-03 | Stop reason: HOSPADM

## 2023-03-02 RX ORDER — PROCHLORPERAZINE MALEATE 10 MG
10 TABLET ORAL EVERY 6 HOURS PRN
Status: DISCONTINUED | OUTPATIENT
Start: 2023-03-02 | End: 2023-03-03 | Stop reason: HOSPADM

## 2023-03-02 RX ORDER — FENTANYL CITRATE 50 UG/ML
50 INJECTION, SOLUTION INTRAMUSCULAR; INTRAVENOUS EVERY 5 MIN PRN
Status: DISCONTINUED | OUTPATIENT
Start: 2023-03-02 | End: 2023-03-02 | Stop reason: HOSPADM

## 2023-03-02 RX ORDER — CEFAZOLIN SODIUM 2 G/100ML
2 INJECTION, SOLUTION INTRAVENOUS EVERY 8 HOURS
Status: DISCONTINUED | OUTPATIENT
Start: 2023-03-03 | End: 2023-03-03 | Stop reason: HOSPADM

## 2023-03-02 RX ADMIN — HYDROMORPHONE HYDROCHLORIDE 0.5 MG: 1 INJECTION, SOLUTION INTRAMUSCULAR; INTRAVENOUS; SUBCUTANEOUS at 19:28

## 2023-03-02 RX ADMIN — SODIUM CHLORIDE, POTASSIUM CHLORIDE, SODIUM LACTATE AND CALCIUM CHLORIDE: 600; 310; 30; 20 INJECTION, SOLUTION INTRAVENOUS at 15:21

## 2023-03-02 RX ADMIN — HYDROMORPHONE HYDROCHLORIDE 1 MG: 1 INJECTION, SOLUTION INTRAMUSCULAR; INTRAVENOUS; SUBCUTANEOUS at 17:22

## 2023-03-02 RX ADMIN — ROCURONIUM BROMIDE 20 MG: 50 INJECTION, SOLUTION INTRAVENOUS at 16:49

## 2023-03-02 RX ADMIN — KETOROLAC TROMETHAMINE 15 MG: 15 INJECTION, SOLUTION INTRAMUSCULAR; INTRAVENOUS at 23:18

## 2023-03-02 RX ADMIN — APREPITANT 40 MG: 40 CAPSULE ORAL at 14:55

## 2023-03-02 RX ADMIN — SUGAMMADEX 200 MG: 100 INJECTION, SOLUTION INTRAVENOUS at 17:09

## 2023-03-02 RX ADMIN — MIDAZOLAM 2 MG: 1 INJECTION INTRAMUSCULAR; INTRAVENOUS at 15:47

## 2023-03-02 RX ADMIN — FENTANYL CITRATE 50 MCG: 50 INJECTION, SOLUTION INTRAMUSCULAR; INTRAVENOUS at 17:44

## 2023-03-02 RX ADMIN — FENTANYL CITRATE 100 MCG: 50 INJECTION, SOLUTION INTRAMUSCULAR; INTRAVENOUS at 16:10

## 2023-03-02 RX ADMIN — ACETAMINOPHEN 975 MG: 325 TABLET ORAL at 14:55

## 2023-03-02 RX ADMIN — KETOROLAC TROMETHAMINE 15 MG: 30 INJECTION, SOLUTION INTRAMUSCULAR at 17:05

## 2023-03-02 RX ADMIN — SENNOSIDES AND DOCUSATE SODIUM 1 TABLET: 50; 8.6 TABLET ORAL at 20:31

## 2023-03-02 RX ADMIN — FENTANYL CITRATE 50 MCG: 50 INJECTION, SOLUTION INTRAMUSCULAR; INTRAVENOUS at 17:39

## 2023-03-02 RX ADMIN — KETAMINE HYDROCHLORIDE 30 MG: 10 INJECTION, SOLUTION INTRAMUSCULAR; INTRAVENOUS at 16:10

## 2023-03-02 RX ADMIN — ONDANSETRON 4 MG: 2 INJECTION INTRAMUSCULAR; INTRAVENOUS at 17:04

## 2023-03-02 RX ADMIN — HYDROMORPHONE HYDROCHLORIDE 0.5 MG: 1 INJECTION, SOLUTION INTRAMUSCULAR; INTRAVENOUS; SUBCUTANEOUS at 21:34

## 2023-03-02 RX ADMIN — HYDROMORPHONE HYDROCHLORIDE 0.4 MG: 1 INJECTION, SOLUTION INTRAMUSCULAR; INTRAVENOUS; SUBCUTANEOUS at 18:12

## 2023-03-02 RX ADMIN — ACETAMINOPHEN 975 MG: 325 TABLET ORAL at 23:18

## 2023-03-02 RX ADMIN — PROPOFOL 75 MCG/KG/MIN: 10 INJECTION, EMULSION INTRAVENOUS at 16:00

## 2023-03-02 RX ADMIN — LIDOCAINE HYDROCHLORIDE 5 ML: 10 INJECTION, SOLUTION INFILTRATION; PERINEURAL at 15:58

## 2023-03-02 RX ADMIN — MIDAZOLAM 2 MG: 1 INJECTION INTRAMUSCULAR; INTRAVENOUS at 15:53

## 2023-03-02 RX ADMIN — MAGNESIUM SULFATE HEPTAHYDRATE 4 G: 80 INJECTION, SOLUTION INTRAVENOUS at 15:21

## 2023-03-02 RX ADMIN — DEXAMETHASONE SODIUM PHOSPHATE 10 MG: 10 INJECTION, SOLUTION INTRAMUSCULAR; INTRAVENOUS at 16:12

## 2023-03-02 RX ADMIN — GABAPENTIN 600 MG: 300 CAPSULE ORAL at 20:31

## 2023-03-02 RX ADMIN — FENTANYL CITRATE 50 MCG: 50 INJECTION, SOLUTION INTRAMUSCULAR; INTRAVENOUS at 17:49

## 2023-03-02 RX ADMIN — KETAMINE HYDROCHLORIDE 20 MG: 10 INJECTION, SOLUTION INTRAMUSCULAR; INTRAVENOUS at 16:12

## 2023-03-02 RX ADMIN — ROCURONIUM BROMIDE 50 MG: 50 INJECTION, SOLUTION INTRAVENOUS at 16:11

## 2023-03-02 RX ADMIN — PROPOFOL 100 MG: 10 INJECTION, EMULSION INTRAVENOUS at 16:10

## 2023-03-02 RX ADMIN — Medication 2 G: at 16:15

## 2023-03-02 RX ADMIN — FENTANYL CITRATE 50 MCG: 50 INJECTION, SOLUTION INTRAMUSCULAR; INTRAVENOUS at 17:54

## 2023-03-02 RX ADMIN — HYDROMORPHONE HYDROCHLORIDE 0.4 MG: 1 INJECTION, SOLUTION INTRAMUSCULAR; INTRAVENOUS; SUBCUTANEOUS at 18:07

## 2023-03-02 RX ADMIN — DEXMEDETOMIDINE 12 MCG: 100 INJECTION, SOLUTION, CONCENTRATE INTRAVENOUS at 17:22

## 2023-03-02 RX ADMIN — GLYCOPYRROLATE 0.2 MG: 0.2 INJECTION, SOLUTION INTRAMUSCULAR; INTRAVENOUS at 16:10

## 2023-03-02 ASSESSMENT — ACTIVITIES OF DAILY LIVING (ADL)
ADLS_ACUITY_SCORE: 35
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
## 2023-03-02 NOTE — ANESTHESIA CARE TRANSFER NOTE
Patient: Eden Castro    Procedure: Procedure(s):  ROBOTIC ASSISTED LAPAROSCOPY ABDOMEN, PERITONEUM AND OMENTUM DIAGNOSTIC       Diagnosis: Pelvic hematoma in female [N94.89]  Diagnosis Additional Information: No value filed.    Anesthesia Type:   General     Note:    Oropharynx: oropharynx clear of all foreign objects  Level of Consciousness: awake  Oxygen Supplementation: face mask  Level of Supplemental Oxygen (L/min / FiO2): 8  Independent Airway: airway patency satisfactory and stable  Dentition: dentition unchanged  Vital Signs Stable: post-procedure vital signs reviewed and stable  Report to RN Given: handoff report given  Patient transferred to: PACU    Handoff Report: Identifed the Patient, Identified the Reponsible Provider, Reviewed the pertinent medical history, Discussed the surgical course, Reviewed Intra-OP anesthesia mangement and issues during anesthesia, Set expectations for post-procedure period and Allowed opportunity for questions and acknowledgement of understanding      Vitals:  Vitals Value Taken Time   /88 03/02/23 1721   Temp 36.3  C (97.3  F) 03/02/23 1719   Pulse 72 03/02/23 1724   Resp 17 03/02/23 1724   SpO2 96 % 03/02/23 1724   Vitals shown include unvalidated device data.    Electronically Signed By: Jacek Kelsey  March 2, 2023  5:25 PM

## 2023-03-02 NOTE — ANESTHESIA PROCEDURE NOTES
Airway       Patient location during procedure: OR       Procedure Start/Stop Times: 3/2/2023 4:14 PM  Staff -        Anesthesiologist:  Stevie Quintero MD       Resident/Fellow: Low Read       Performed By: SRNAIndications and Patient Condition       Indications for airway management: ria-procedural       Induction type:intravenous       Mask difficulty assessment: 1 - vent by mask    Final Airway Details       Final airway type: endotracheal airway       Successful airway: ETT - single  Endotracheal Airway Details        ETT size (mm): 7.0       Cuffed: yes       Successful intubation technique: direct laryngoscopy       DL Blade Type: Aguiar 2       Grade View of Cords: 1       Adjucts: stylet       Position: Right       Measured from: lips       Secured at (cm): 22       Bite block used: None    Post intubation assessment        Placement verified by: capnometry, equal breath sounds and chest rise        Number of attempts at approach: 1       Number of other approaches attempted: 0       Secured with: silk tape       Ease of procedure: easy       Dentition: Intact and Unchanged       Dental guard used and removed. Dental Guard Type: Proguard Red.    Medication(s) Administered   Medication Administration Time: 3/2/2023 4:14 PM

## 2023-03-02 NOTE — INTERVAL H&P NOTE
"I have reviewed the surgical (or preoperative) H&P that is linked to this encounter, and examined the patient. There are no significant changes    Clinical Conditions Present on Arrival:  Clinically Significant Risk Factors Present on Admission           # Hyponatremia: Lowest Na = 133 mmol/L in last 30 days, will monitor as appropriate          # Obesity: Estimated body mass index is 32.01 kg/m  as calculated from the following:    Height as of 2/22/23: 1.626 m (5' 4\").    Weight as of 2/22/23: 84.6 kg (186 lb 8 oz).       "

## 2023-03-02 NOTE — BRIEF OP NOTE
[facility]    Brief Operative Note    Pre-operative diagnosis: Pelvic hematoma in female [N94.89]  Post-operative diagnosis Same as pre-operative diagnosis    Procedure: Procedure(s):  ROBOTIC ASSISTED LAPAROSCOPY ABDOMEN, PERITONEUM AND OMENTUM DIAGNOSTIC, drainage of pelvic hematoma  Surgeon: Surgeon(s) and Role:     * Paul Gonzalez MD - Primary  Anesthesia: General   Estimated Blood Loss: Minimal    Drains: None  Specimens: * No specimens in log *  Findings:   Retropubic hematoma  Complications: None.  Implants: * No implants in log *

## 2023-03-03 VITALS
TEMPERATURE: 97.8 F | WEIGHT: 187 LBS | HEIGHT: 64 IN | BODY MASS INDEX: 31.92 KG/M2 | SYSTOLIC BLOOD PRESSURE: 112 MMHG | RESPIRATION RATE: 18 BRPM | OXYGEN SATURATION: 96 % | HEART RATE: 61 BPM | DIASTOLIC BLOOD PRESSURE: 57 MMHG

## 2023-03-03 PROBLEM — K66.1 HEMOPERITONEUM: Status: ACTIVE | Noted: 2023-03-03

## 2023-03-03 PROBLEM — D62 ANEMIA DUE TO BLOOD LOSS, ACUTE: Status: ACTIVE | Noted: 2023-03-03

## 2023-03-03 PROCEDURE — 250N000011 HC RX IP 250 OP 636: Performed by: OBSTETRICS & GYNECOLOGY

## 2023-03-03 PROCEDURE — 250N000013 HC RX MED GY IP 250 OP 250 PS 637: Performed by: OBSTETRICS & GYNECOLOGY

## 2023-03-03 PROCEDURE — 258N000003 HC RX IP 258 OP 636: Performed by: OBSTETRICS & GYNECOLOGY

## 2023-03-03 RX ORDER — OXYCODONE HYDROCHLORIDE 5 MG/1
10 TABLET ORAL EVERY 4 HOURS PRN
Qty: 10 TABLET | Refills: 0 | Status: SHIPPED | OUTPATIENT
Start: 2023-03-03

## 2023-03-03 RX ADMIN — SODIUM CHLORIDE, POTASSIUM CHLORIDE, SODIUM LACTATE AND CALCIUM CHLORIDE: 600; 310; 30; 20 INJECTION, SOLUTION INTRAVENOUS at 02:13

## 2023-03-03 RX ADMIN — OXYCODONE HYDROCHLORIDE 10 MG: 5 TABLET ORAL at 09:34

## 2023-03-03 RX ADMIN — CEFAZOLIN SODIUM 2 G: 2 INJECTION, SOLUTION INTRAVENOUS at 09:24

## 2023-03-03 RX ADMIN — KETOROLAC TROMETHAMINE 15 MG: 15 INJECTION, SOLUTION INTRAMUSCULAR; INTRAVENOUS at 06:05

## 2023-03-03 RX ADMIN — SENNOSIDES AND DOCUSATE SODIUM 1 TABLET: 50; 8.6 TABLET ORAL at 09:24

## 2023-03-03 RX ADMIN — HYDROMORPHONE HYDROCHLORIDE 0.5 MG: 1 INJECTION, SOLUTION INTRAMUSCULAR; INTRAVENOUS; SUBCUTANEOUS at 04:54

## 2023-03-03 RX ADMIN — HYDROMORPHONE HYDROCHLORIDE 0.5 MG: 1 INJECTION, SOLUTION INTRAMUSCULAR; INTRAVENOUS; SUBCUTANEOUS at 07:05

## 2023-03-03 RX ADMIN — HYDROMORPHONE HYDROCHLORIDE 0.5 MG: 1 INJECTION, SOLUTION INTRAMUSCULAR; INTRAVENOUS; SUBCUTANEOUS at 02:13

## 2023-03-03 RX ADMIN — GABAPENTIN 300 MG: 300 CAPSULE ORAL at 09:24

## 2023-03-03 RX ADMIN — CEFAZOLIN SODIUM 2 G: 2 INJECTION, SOLUTION INTRAVENOUS at 00:02

## 2023-03-03 RX ADMIN — ACETAMINOPHEN 975 MG: 325 TABLET ORAL at 06:06

## 2023-03-03 RX ADMIN — HYDROMORPHONE HYDROCHLORIDE 0.5 MG: 1 INJECTION, SOLUTION INTRAMUSCULAR; INTRAVENOUS; SUBCUTANEOUS at 00:02

## 2023-03-03 ASSESSMENT — ACTIVITIES OF DAILY LIVING (ADL)
ADLS_ACUITY_SCORE: 35

## 2023-03-03 NOTE — CONSULTS
Care Management Follow Up    Length of Stay (days): 0    Expected Discharge Date: 03/03/2023     Concerns to be Addressed:  Discharge planning     Patient plan of care discussed at interdisciplinary rounds: Yes    Anticipated Discharge Disposition:  Home no needs   Anticipated Discharge Services:  n/a  Anticipated Discharge DME:  n/a    Patient/family educated on Medicare website which has current facility and service quality ratings:  n/a  Education Provided on the Discharge Plan:  n/a  Patient/Family in Agreement with the Plan:  yes    Referrals Placed by CM/SW:  n/a  Private pay costs discussed: Not applicable    Additional Information:  SW completed chart assessment due to low readmissions score. Pt comes from home with family, and appears to be independent at baseline. Anticipate no needs from care management at discharge. Pt to follow with PCP if needs arise post discharge.    CM to continue to follow through hospitalization.  8:09 AM    CRISTOPHER Berrios  3/3/2023

## 2023-03-03 NOTE — DISCHARGE INSTRUCTIONS
No heavy lifting  Observe lap incision sites for signs of infection ie: redness, swelling, yellow/green drainage, fever, change in pain at incision sites, report symptoms to surgeon  Encourage water/fluids and nutritional diet, prevent constipation

## 2023-03-03 NOTE — PROGRESS NOTES
Pt reports abdominal pain is better today s/p day 1. Voiding appropriately, ambulating to toilet without concern. Pain managed with po meds

## 2023-03-03 NOTE — PHARMACY-ADMISSION MEDICATION HISTORY
Pharmacy Note - Admission Medication History    Pertinent Provider Information: Admission medication history done by pharmacist 2 days ago when pt was in ED, then reassessed by pre-op nurse today   ______________________________________________________________________    Prior To Admission (PTA) med list completed and updated in EMR.       PTA Med List   Medication Sig Last Dose     acetaminophen (TYLENOL) 325 MG tablet Take 325-650 mg by mouth every 6 hours as needed for mild pain 3/2/2023 at 0500     gabapentin (NEURONTIN) 100 MG capsule Take 200 mg by mouth daily At 2pm, in addition to other gabapentin orders--300 mg QAM, 600 mg QHS 3/1/2023     gabapentin (NEURONTIN) 300 MG capsule Take 300-600 mg by mouth 2 times daily 300mg qam and 200mg qafternoon and 600mg qhs 3/2/2023     ibuprofen (ADVIL/MOTRIN) 800 MG tablet Take 1 tablet (800 mg) by mouth every 6 hours as needed for other (mild and/or inflammatory pain) 3/1/2023     magnesium 250 MG tablet Take 1 tablet by mouth At Bedtime Restless legs 3/1/2023     ondansetron (ZOFRAN ODT) 8 MG ODT tab Take 8 mg by mouth 2 times daily as needed for nausea 2/26/2023     oxyCODONE (ROXICODONE) 5 MG tablet Take 2 tablets (10 mg) by mouth every 4 hours as needed for moderate to severe pain 3/2/2023 at 1300     Vitamin D3 (CHOLECALCIFEROL) 125 MCG (5000 UT) tablet Take 125 mcg by mouth At Bedtime 3/1/2023       Information source(s): Patient, Clinic records, Hospital records and [facility]  Method of interview communication: N/A    Summary of Changes to PTA Med List  New:   Discontinued:   Changed: gabapentin added line item for 200 mg dose    Patient was asked about OTC/herbal products specifically.  PTA med list reflects this.    In the past week, patient estimated taking medication this percent of the time:  greater than 90%.    Medication Affordability:       Allergies were reviewed, assessed, and updated with the patient.      Patient does not use any  multi-dose medications prior to admission.    The information provided in this note is only as accurate as the sources available at the time of the update(s).    Thank you for the opportunity to participate in the care of this patient.    Emily Goss [facility]  3/2/2023 7:18 PM

## 2023-03-03 NOTE — PLAN OF CARE
Goal Outcome Evaluation:      Plan of Care Reviewed With: patient          Outcome Evaluation: pt discharging this am accompanied by her mother. has only ambulated in her room, given encouragement by nsg & MD services to be up & moving. tolerated regdular texture diet.

## 2023-03-03 NOTE — ANESTHESIA POSTPROCEDURE EVALUATION
Patient: Eden Castro    Procedure: Procedure(s):  ROBOTIC ASSISTED LAPAROSCOPY ABDOMEN, PERITONEUM AND OMENTUM DIAGNOSTIC       Anesthesia Type:  General    Note:  Disposition: Inpatient   Postop Pain Control: Uneventful            Sign Out: Well controlled pain   PONV: No   Neuro/Psych: Uneventful            Sign Out: Acceptable/Baseline neuro status   Airway/Respiratory: Uneventful            Sign Out: Acceptable/Baseline resp. status   CV/Hemodynamics: Uneventful            Sign Out: Acceptable CV status; No obvious hypovolemia; No obvious fluid overload   Other NRE: NONE   DID A NON-ROUTINE EVENT OCCUR? No           Last vitals:  Vitals Value Taken Time   BP 98/52 03/02/23 1815   Temp 36.3  C (97.3  F) 03/02/23 1719   Pulse 62 03/02/23 1819   Resp 14 03/02/23 1819   SpO2 98 % 03/02/23 1819   Vitals shown include unvalidated device data.    Electronically Signed By: Alvin Wetzel MD  March 2, 2023  7:41 PM

## 2023-03-03 NOTE — PLAN OF CARE
"  Problem: Plan of Care - These are the overarching goals to be used throughout the patient stay.    Goal: Patient-Specific Goal (Individualized)  Description: You can add care plan individualizations to a care plan. Examples of Individualization might be:  \"Parent requests to be called daily at 9am for status\", \"I have a hard time hearing out of my right ear\", or \"Do not touch me to wake me up as it startles me\".  Outcome: Progressing  Goal: Absence of Hospital-Acquired Illness or Injury  Outcome: Progressing  Intervention: Identify and Manage Fall Risk  Recent Flowsheet Documentation  Taken 3/3/2023 0400 by Daysi Valdes RN  Safety Promotion/Fall Prevention:   clutter free environment maintained   fall prevention program maintained   lighting adjusted   nonskid shoes/slippers when out of bed   patient and family education   room door open   safety round/check completed  Taken 3/3/2023 0000 by Daysi Valdes RN  Safety Promotion/Fall Prevention:   clutter free environment maintained   fall prevention program maintained   lighting adjusted   nonskid shoes/slippers when out of bed   patient and family education   room door open   safety round/check completed  Taken 3/2/2023 2030 by Daysi Valdes RN  Safety Promotion/Fall Prevention:   clutter free environment maintained   fall prevention program maintained   lighting adjusted   nonskid shoes/slippers when out of bed   patient and family education   room door open   safety round/check completed  Intervention: Prevent Skin Injury  Recent Flowsheet Documentation  Taken 3/3/2023 0400 by Daysi Valdes RN  Body Position: position changed independently  Taken 3/3/2023 0000 by Daysi Valdes RN  Body Position: position changed independently  Taken 3/2/2023 2030 by Daysi Valdes RN  Body Position: position changed independently  Goal: Optimal Comfort and Wellbeing  Outcome: Progressing  Intervention: Monitor Pain and Promote Comfort  Recent Flowsheet Documentation  Taken " 3/3/2023 0705 by Daysi Valdes RN  Pain Management Interventions: medication (see MAR)  Taken 3/3/2023 0605 by Daysi Valdes RN  Pain Management Interventions: medication (see MAR)  Taken 3/3/2023 0454 by Daysi Valdes RN  Pain Management Interventions: medication (see MAR)  Taken 3/3/2023 0002 by Daysi Valdes RN  Pain Management Interventions: medication (see MAR)  Taken 3/2/2023 2318 by Daysi Valdes RN  Pain Management Interventions: medication (see MAR)  Taken 3/2/2023 2134 by Daysi Valdes RN  Pain Management Interventions: medication (see MAR)     Problem: Pain Acute  Goal: Optimal Pain Control and Function  Outcome: Progressing  Intervention: Develop Pain Management Plan  Recent Flowsheet Documentation  Taken 3/3/2023 0705 by Daysi Valdes RN  Pain Management Interventions: medication (see MAR)  Taken 3/3/2023 0605 by Daysi Valdes RN  Pain Management Interventions: medication (see MAR)  Taken 3/3/2023 0454 by Daysi Valdes RN  Pain Management Interventions: medication (see MAR)  Taken 3/3/2023 0002 by Daysi Valdes RN  Pain Management Interventions: medication (see MAR)  Taken 3/2/2023 2318 by Daysi Valdes RN  Pain Management Interventions: medication (see MAR)  Taken 3/2/2023 2134 by Daysi Valdes RN  Pain Management Interventions: medication (see MAR)  Intervention: Prevent or Manage Pain  Recent Flowsheet Documentation  Taken 3/3/2023 0400 by Daysi Valdes RN  Medication Review/Management: medications reviewed  Taken 3/3/2023 0000 by Daysi Valdes RN  Medication Review/Management: medications reviewed  Taken 3/2/2023 2030 by Daysi Valdes RN  Medication Review/Management: medications reviewed     Problem: Surgical Site Infection  Goal: Absence of Infection Signs and Symptoms  Outcome: Progressing     Pt's pain is managed with dilaudid and scheduled tylenol and toradol. IVF infusing. Instructed pt of switching to oral pain meds today and pt verbalized understanding. Independent in room. Dressing CDI.  Tolerating clears. Pt hopes to discharge today. Voiding well no issues.

## 2023-03-04 ENCOUNTER — TRANSFERRED RECORDS (OUTPATIENT)
Dept: HEALTH INFORMATION MANAGEMENT | Facility: CLINIC | Age: 33
End: 2023-03-04

## 2023-03-04 NOTE — DISCHARGE SUMMARY
[facility] Discharge Summary    Eden Castro MRN# [id number]   Age: 32 year old YOB: 1990     Date of Admission:  3/2/2023  Date of Discharge::  3/3/2023   Admitting Physician:  Dr. Paul Gonzalez  Discharge Physician:  Gayatri Flowers MD     Home clinic: [facility]            Admission Diagnoses:   Pelvic hematoma in female [N94.89]          Discharge Diagnosis:   As above           Procedures:   Procedure(s):  robobtic evacuation ;of pelvic hematoma                  Medications Prior to Admission:     No medications prior to admission.             Discharge Medications:     Discharge Medication List as of 3/3/2023 11:24 AM      CONTINUE these medications which have CHANGED    Details   oxyCODONE (ROXICODONE) 5 MG tablet Take 2 tablets (10 mg) by mouth every 4 hours as needed for moderate to severe pain, Disp-10 tablet, R-0, E-Prescribe         CONTINUE these medications which have NOT CHANGED    Details   acetaminophen (TYLENOL) 325 MG tablet Take 325-650 mg by mouth every 6 hours as needed for mild pain, Historical      !! gabapentin (NEURONTIN) 100 MG capsule Take 200 mg by mouth daily At 2pm, in addition to other gabapentin orders--300 mg QAM, 600 mg QHS, Historical      !! gabapentin (NEURONTIN) 300 MG capsule Take 300-600 mg by mouth 2 times daily 300mg qam and 200mg qafternoon and 600mg qhs, Historical      ibuprofen (ADVIL/MOTRIN) 800 MG tablet Take 1 tablet (800 mg) by mouth every 6 hours as needed for other (mild and/or inflammatory pain), Disp-30 tablet, R-0, Local Print      magnesium 250 MG tablet Take 1 tablet by mouth At Bedtime Restless legs, Historical      ondansetron (ZOFRAN ODT) 8 MG ODT tab Take 8 mg by mouth 2 times daily as needed for nausea, Historical      Vitamin D3 (CHOLECALCIFEROL) 125 MCG (5000 UT) tablet Take 125 mcg by mouth At Bedtime, Historical       !! - Potential duplicate medications found. Please discuss with provider.                 Consultations:   No consultations were requested during this admission          Brief History of Illness:   Was admitted for surgery due to large pelvic hematoma           Hospital Course:   The patient's hospital course was unremarkable.  She recovered as anticipated and experienced no post-operative complications.           Discharge Instructions and Follow-Up:   Discharge diet: Regular   Discharge activity: No heavy lifting, pushing, pulling for 6 week(s)  No driving or operating machinery while on narcotic analgesics   Discharge follow-up: Follow up with Dr. Gonzalez in 1-2 weeks   Wound care Drink plenty of fluids  Ice to area for comfort  Keep wound clean and dry           Discharge Disposition:   Discharged to home      Attestation:  I have reviewed today's vital signs, notes, medications, labs and imaging.    Gayatri Flowers MD

## 2023-03-05 LAB
BACTERIA BLD CULT: NO GROWTH
BACTERIA BLD CULT: NO GROWTH

## 2023-03-06 LAB — BACTERIA FLD CULT: NO GROWTH

## 2023-03-08 ENCOUNTER — LAB REQUISITION (OUTPATIENT)
Dept: LAB | Facility: CLINIC | Age: 33
End: 2023-03-08

## 2023-03-08 DIAGNOSIS — R35.0 FREQUENCY OF MICTURITION: ICD-10-CM

## 2023-03-08 LAB — BACTERIA FLD CULT: NORMAL

## 2023-03-08 PROCEDURE — 87086 URINE CULTURE/COLONY COUNT: CPT | Performed by: OBSTETRICS & GYNECOLOGY

## 2023-03-09 ENCOUNTER — HOSPITAL ENCOUNTER (OUTPATIENT)
Dept: CT IMAGING | Facility: HOSPITAL | Age: 33
Discharge: HOME | End: 2023-03-09
Attending: OBSTETRICS & GYNECOLOGY | Admitting: OBSTETRICS & GYNECOLOGY
Payer: COMMERCIAL

## 2023-03-09 DIAGNOSIS — G89.18 POSTOPERATIVE PAIN: ICD-10-CM

## 2023-03-09 PROCEDURE — 74177 CT ABD & PELVIS W/CONTRAST: CPT

## 2023-03-09 PROCEDURE — 250N000011 HC RX IP 250 OP 636: Performed by: OBSTETRICS & GYNECOLOGY

## 2023-03-09 RX ORDER — IOPAMIDOL 755 MG/ML
100 INJECTION, SOLUTION INTRAVASCULAR ONCE
Status: COMPLETED | OUTPATIENT
Start: 2023-03-09 | End: 2023-03-09

## 2023-03-09 RX ADMIN — IOPAMIDOL 100 ML: 755 INJECTION, SOLUTION INTRAVENOUS at 11:38

## 2023-03-09 NOTE — DISCHARGE SUMMARY
[facility] Discharge Summary    Eden Castro MRN# [id number]   Age: 32 year old YOB: 1990     Date of Admission:  03/02/23    Date of Discharge::  3/3/2023   Admitting Physician:  Dr. Paul Pottsischarge Physician:  Gayatri Flowers MD     Home clinic: [facility]            Admission Diagnoses:   Pelvic hematoma            Discharge Diagnosis:   Same as above   S/p surgical evacuation of hematoma          Procedures:   Procedure(s):  ROBOTIC ASSISTED LAPAROSCOPY ABDOMEN, PERITONEUM AND OMENTUM DIAGNOSTIC, drainage of pelvic hematoma                  Medications Prior to Admission:   (Not in a hospital admission)            Discharge Medications:     Discharge Medication List as of 3/1/2023 12:35 PM      CONTINUE these medications which have CHANGED    Details   oxyCODONE (ROXICODONE) 5 MG tablet Take 2 tablets (10 mg) by mouth every 4 hours as needed for moderate to severe pain, Disp-30 tablet, R-0, E-Prescribe         CONTINUE these medications which have NOT CHANGED    Details   acetaminophen (TYLENOL) 325 MG tablet Take 325-650 mg by mouth every 6 hours as needed for mild pain, Historical      !! gabapentin (NEURONTIN) 300 MG capsule Take 300-600 mg by mouth 2 times daily 300mg qam and 200mg qafternoon and 600mg qhs, Historical      ibuprofen (ADVIL/MOTRIN) 800 MG tablet Take 1 tablet (800 mg) by mouth every 6 hours as needed for other (mild and/or inflammatory pain), Disp-30 tablet, R-0, Local Print      magnesium 250 MG tablet Take 1 tablet by mouth At Bedtime Restless legs, Historical      ondansetron (ZOFRAN ODT) 8 MG ODT tab Take 8 mg by mouth 2 times daily as needed for nausea, Historical      Vitamin D3 (CHOLECALCIFEROL) 125 MCG (5000 UT) tablet Take 125 mcg by mouth At Bedtime, Historical      !! gabapentin (NEURONTIN) 100 MG capsule Take 200 mg by mouth daily at 2 pm 300mg qam and 200mg qafternoon and 600mg qhs, Historical       !! - Potential duplicate  medications found. Please discuss with provider.      STOP taking these medications       nicotine (NICODERM CQ) 14 MG/24HR 24 hr patch Comments:   Reason for Stopping:                     Consultations:   No consultations were requested during this admission          Brief History of Illness:       Patient is status post robotic hysterectomy with sacral colpopexy.  She then presesnted to ED with abdominal bloating and pain.  CT obtained which showed large intraabdominal colleciton.  Interventional radiology evaluated for drainage and was determined was large hematoma and unable to drain.  She was then sent home and returned for outpatient surgery.          Hospital Course:   The patient's hospital course was unremarkable.  She recovered as anticipated and experienced no post-operative complications.           Discharge Instructions and Follow-Up:   Discharge diet: Regular   Discharge activity: No heavy lifting, pushing, pulling for 6 week(s)  No driving or operating machinery while on narcotic analgesics   Discharge follow-up: Follow up with Dr. Gonzalez in 2 weeks   Wound care Drink plenty of fluids  Ice to area for comfort  Keep wound clean and dry           Discharge Disposition:   Discharged to home      Attestation:  I have reviewed today's vital signs, notes, medications, labs and imaging.    Gayatri Flowers MD

## 2023-03-10 LAB — BACTERIA UR CULT: NO GROWTH

## 2023-03-11 NOTE — OP NOTE
Procedure Date: 03/02/2023    PREOPERATIVE DIAGNOSIS:  Retroperitoneal hematoma.    POSTOPERATIVE DIAGNOSES:  Retroperitoneal hematoma.    PROCEDURE:  Robotic evacuation of retropubic hematoma.    SURGEON:  Paul Gonzalez MD    ASSISTANT:  Kathie Bailey.    ANESTHESIA:  General.    ESTIMATED BLOOD LOSS:  Minimal, replaced with lactated Ringer's.    DRAINS:  None.    COMPLICATIONS:  None.    INDICATIONS FOR PROCEDURE:  This is a 32-year-old female who is status post pelvic floor reconstruction, who developed a retropubic hematoma and has had significant pain related to that.  After informed consent, the decision was made to evacuate the hematoma.  The risks, benefits and alternatives were discussed at length.  She expressed understanding and wished to proceed.    OPERATIVE FINDINGS:  The patient had had a sacral colpopexy and that looked intact and was retroperitoneal.  Support was excellent intraperitoneally and no abnormalities were noted there.  Within the space of Retzius, there was a large clotted area along both gutters of the bladder sulci.  No other abnormalities were noted.  No active bleeder was noted.    PROCEDURE NOTE:  The patient was brought to the operating room and after induction of general anesthesia, was prepped and draped in the dorsal lithotomy position.  A timeout was called.  The patient and the procedure were verified.  A Alejandra was placed and attention was directed to the abdomen.  The old supraumbilical incision was used to place an 8 mm trocar under direct visualization and then the pelvis was inspected and 2 additional trocars were placed, one to the right and one to the left, and the appropriate trocars were placed again under direct visualization.  The space of Retzius was then opened using monopolar scissors.  The hematoma was encountered.  Suction and copious amounts of irrigation were used evacuate and clean it out, and then the area was thoroughly inspected.  Whit was then applied  to the hematoma areas and, at this point, the decision was made to terminate the procedure.  The robot was undocked, CO2 was allowed to be released from the abdomen.  The trocars were removed.  The incisions were closed with nylon.  Sponge and instrument counts were correct.  The patient tolerated the procedure well and was taken to the recovery room in good condition.    Paul Gonzalez MD        D: 03/10/2023   T: 03/10/2023   MT: [email or web address]    Name:     AYLA ROQUEJasmina  MRN:      [id number]        Account:        [id number]   :      1990           Procedure Date: 2023     Document: [id number]

## 2023-03-13 NOTE — OP NOTE
Procedure Date: 03/02/2023    PREOPERATIVE DIAGNOSIS:  Retropubic hematoma.    POSTOPERATIVE DIAGNOSES:  Same    PROCEDURE: Laparoscopy with Robotic evacuation of retropubic hematoma.    SURGEON:  Paul Gonzalez MD    ASSISTANT:  Kathie Bailey.    ANESTHESIA:  General.    ESTIMATED BLOOD LOSS:  Minimal, replaced with lactated Ringer's.    DRAINS:  None.    COMPLICATIONS:  None.    INDICATIONS FOR PROCEDURE:  This is a 32-year-old female who is status post pelvic floor reconstruction, who developed a retropubic hematoma and has had significant pain related to that.  After informed consent, the decision was made to evacuate the hematoma.  The risks, benefits and alternatives were discussed at length.  She expressed understanding and wished to proceed.    OPERATIVE FINDINGS:  The patient had had a sacral colpopexy and that looked intact and was retroperitoneal.  Support was excellent intraperitoneally and no abnormalities were noted there.  Within the space of Retzius, there was a large clotted area along both gutters of the bladder sulci.  No other abnormalities were noted.  No active bleeder was noted.    PROCEDURE NOTE:  The patient was brought to the operating room and after induction of general anesthesia, was prepped and draped in the dorsal lithotomy position.  A timeout was called.  The patient and the procedure were verified.  A Alejandra was placed and attention was directed to the abdomen.  The old supraumbilical incision was used to place an 8 mm trocar under direct visualization and then the pelvis was inspected and 2 additional trocars were placed, one to the right and one to the left, and the appropriate trocars were placed again under direct visualization.  The space of Retzius was then opened using monopolar scissors.  The hematoma was encountered.  Suction and copious amounts of irrigation were used evacuate and clean it out, and then the area was thoroughly inspected.  Whit was then applied to the  hematoma areas and, at this point, the decision was made to terminate the procedure.  The robot was undocked, CO2 was allowed to be released from the abdomen.  The trocars were removed.  The incisions were closed with nylon.  Sponge and instrument counts were correct.  The patient tolerated the procedure well and was taken to the recovery room in good condition.    Paul Gonzalez MD        D: 03/10/2023   T: 03/10/2023   MT: [email or web address]    Name:     AYLA ROQUEJasmina  MRN:      [id number]        Account:        [id number]   :      1990           Procedure Date: 2023     Document: [id number]

## 2023-05-01 NOTE — PROGRESS NOTES
This is a recent snapshot of the patient's [facility] medical record.  For current drug dose and complete information and questions, call [phone] or In Basket pool, [facility] home infusion ([id number])  CSN Number:  [id number]

## 2023-05-18 NOTE — PROGRESS NOTES
This is a recent snapshot of the patient's [facility] medical record.  For current drug dose and complete information and questions, call [phone] or In Basket pool, [facility] home infusion ([id number])  CSN Number:  [id number]

## 2023-05-30 NOTE — PROGRESS NOTES
This is a recent snapshot of the patient's [facility] medical record.  For current drug dose and complete information and questions, call [phone] or In Basket pool, [facility] home infusion ([id number])  CSN Number:  [id number]

## 2023-08-04 NOTE — PROGRESS NOTES
This is a recent snapshot of the patient's [facility] medical record.  For current drug dose and complete information and questions, call [phone] or In Basket pool, [facility] home infusion ([id number])  CSN Number:  [id number]

## 2024-04-23 ENCOUNTER — LAB REQUISITION (OUTPATIENT)
Dept: LAB | Facility: CLINIC | Age: 34
End: 2024-04-23
Payer: COMMERCIAL

## 2024-04-23 DIAGNOSIS — Z12.4 ENCOUNTER FOR SCREENING FOR MALIGNANT NEOPLASM OF CERVIX: ICD-10-CM

## 2024-04-23 PROCEDURE — 87624 HPV HI-RISK TYP POOLED RSLT: CPT | Mod: ORL | Performed by: OBSTETRICS & GYNECOLOGY

## 2024-04-23 PROCEDURE — G0145 SCR C/V CYTO,THINLAYER,RESCR: HCPCS | Mod: ORL | Performed by: OBSTETRICS & GYNECOLOGY

## 2024-04-26 LAB
BKR LAB AP GYN ADEQUACY: NORMAL
BKR LAB AP GYN INTERPRETATION: NORMAL
BKR LAB AP HPV REFLEX: NORMAL
BKR LAB AP LMP: NORMAL
BKR LAB AP PREVIOUS ABNL DX: NORMAL
BKR LAB AP PREVIOUS ABNORMAL: NORMAL
PATH REPORT.COMMENTS IMP SPEC: NORMAL
PATH REPORT.COMMENTS IMP SPEC: NORMAL
PATH REPORT.RELEVANT HX SPEC: NORMAL

## 2024-04-30 LAB
HUMAN PAPILLOMA VIRUS 16 DNA: NEGATIVE
HUMAN PAPILLOMA VIRUS 18 DNA: NEGATIVE
HUMAN PAPILLOMA VIRUS FINAL DIAGNOSIS: NORMAL
HUMAN PAPILLOMA VIRUS OTHER HR: NEGATIVE

## 2024-09-13 ENCOUNTER — LAB REQUISITION (OUTPATIENT)
Dept: LAB | Facility: CLINIC | Age: 34
End: 2024-09-13

## 2024-09-13 DIAGNOSIS — Z79.899 OTHER LONG TERM (CURRENT) DRUG THERAPY: ICD-10-CM

## 2024-09-13 LAB
ALBUMIN SERPL BCG-MCNC: 4.4 G/DL (ref 3.5–5.2)
ALP SERPL-CCNC: 73 U/L (ref 40–150)
ALT SERPL W P-5'-P-CCNC: 42 U/L (ref 0–50)
ANION GAP SERPL CALCULATED.3IONS-SCNC: 9 MMOL/L (ref 7–15)
AST SERPL W P-5'-P-CCNC: 20 U/L (ref 0–45)
BILIRUB SERPL-MCNC: 0.8 MG/DL
BUN SERPL-MCNC: 13.7 MG/DL (ref 6–20)
CALCIUM SERPL-MCNC: 9.5 MG/DL (ref 8.8–10.4)
CHLORIDE SERPL-SCNC: 104 MMOL/L (ref 98–107)
CHOLEST SERPL-MCNC: 199 MG/DL
CREAT SERPL-MCNC: 0.87 MG/DL (ref 0.51–0.95)
EGFRCR SERPLBLD CKD-EPI 2021: 90 ML/MIN/1.73M2
FASTING STATUS PATIENT QL REPORTED: ABNORMAL
FASTING STATUS PATIENT QL REPORTED: NORMAL
FERRITIN SERPL-MCNC: 75 NG/ML (ref 6–175)
FOLATE SERPL-MCNC: 11.6 NG/ML (ref 4.6–34.8)
GLUCOSE SERPL-MCNC: 92 MG/DL (ref 70–99)
HBA1C MFR BLD: 4.8 %
HCO3 SERPL-SCNC: 24 MMOL/L (ref 22–29)
HDLC SERPL-MCNC: 53 MG/DL
IRON SERPL-MCNC: 77 UG/DL (ref 37–145)
LDLC SERPL CALC-MCNC: 127 MG/DL
MAGNESIUM SERPL-MCNC: 2.1 MG/DL (ref 1.7–2.3)
NONHDLC SERPL-MCNC: 146 MG/DL
POTASSIUM SERPL-SCNC: 4.2 MMOL/L (ref 3.4–5.3)
PROT SERPL-MCNC: 6.8 G/DL (ref 6.4–8.3)
SODIUM SERPL-SCNC: 137 MMOL/L (ref 135–145)
T3 SERPL-MCNC: 119 NG/DL (ref 85–202)
T4 FREE SERPL-MCNC: 1.08 NG/DL (ref 0.9–1.7)
TRIGL SERPL-MCNC: 93 MG/DL
TSH SERPL DL<=0.005 MIU/L-ACNC: 2.18 UIU/ML (ref 0.3–4.2)
VIT B12 SERPL-MCNC: 582 PG/ML (ref 232–1245)
VIT D+METAB SERPL-MCNC: 64 NG/ML (ref 20–50)

## 2024-09-13 PROCEDURE — 82306 VITAMIN D 25 HYDROXY: CPT | Performed by: NURSE PRACTITIONER

## 2024-09-13 PROCEDURE — 82746 ASSAY OF FOLIC ACID SERUM: CPT | Performed by: NURSE PRACTITIONER

## 2024-09-13 PROCEDURE — 82607 VITAMIN B-12: CPT | Performed by: NURSE PRACTITIONER

## 2024-09-13 PROCEDURE — 83036 HEMOGLOBIN GLYCOSYLATED A1C: CPT | Performed by: NURSE PRACTITIONER

## 2024-09-13 PROCEDURE — 82728 ASSAY OF FERRITIN: CPT | Performed by: NURSE PRACTITIONER

## 2024-09-13 PROCEDURE — 84480 ASSAY TRIIODOTHYRONINE (T3): CPT | Performed by: NURSE PRACTITIONER

## 2024-09-13 PROCEDURE — 80061 LIPID PANEL: CPT | Performed by: NURSE PRACTITIONER

## 2024-09-13 PROCEDURE — 84443 ASSAY THYROID STIM HORMONE: CPT | Performed by: NURSE PRACTITIONER

## 2024-09-13 PROCEDURE — 84439 ASSAY OF FREE THYROXINE: CPT | Performed by: NURSE PRACTITIONER

## 2024-09-13 PROCEDURE — 83540 ASSAY OF IRON: CPT | Performed by: NURSE PRACTITIONER

## 2024-09-13 PROCEDURE — 80053 COMPREHEN METABOLIC PANEL: CPT | Performed by: NURSE PRACTITIONER

## 2024-09-13 PROCEDURE — 83735 ASSAY OF MAGNESIUM: CPT | Performed by: NURSE PRACTITIONER

## 2025-01-22 ENCOUNTER — LAB REQUISITION (OUTPATIENT)
Dept: LAB | Facility: CLINIC | Age: 35
End: 2025-01-22

## 2025-01-22 DIAGNOSIS — R11.2 NAUSEA WITH VOMITING, UNSPECIFIED: ICD-10-CM

## 2025-01-22 PROCEDURE — 83690 ASSAY OF LIPASE: CPT | Performed by: STUDENT IN AN ORGANIZED HEALTH CARE EDUCATION/TRAINING PROGRAM

## 2025-01-23 LAB — LIPASE SERPL-CCNC: 46 U/L (ref 13–60)

## 2025-05-28 ENCOUNTER — LAB REQUISITION (OUTPATIENT)
Dept: LAB | Facility: CLINIC | Age: 35
End: 2025-05-28

## 2025-05-28 DIAGNOSIS — F10.10 ALCOHOL ABUSE, UNCOMPLICATED: ICD-10-CM

## 2025-05-28 DIAGNOSIS — R20.0 ANESTHESIA OF SKIN: ICD-10-CM

## 2025-05-28 LAB — FOLATE SERPL-MCNC: 17.2 NG/ML (ref 4.6–34.8)

## 2025-05-28 PROCEDURE — 82746 ASSAY OF FOLIC ACID SERUM: CPT | Performed by: NURSE PRACTITIONER

## 2025-05-28 PROCEDURE — 82607 VITAMIN B-12: CPT | Performed by: NURSE PRACTITIONER

## 2025-05-28 PROCEDURE — 80053 COMPREHEN METABOLIC PANEL: CPT | Performed by: NURSE PRACTITIONER

## 2025-05-29 LAB
ALBUMIN SERPL BCG-MCNC: 3.8 G/DL (ref 3.5–5.2)
ALP SERPL-CCNC: 70 U/L (ref 40–150)
ALT SERPL W P-5'-P-CCNC: 42 U/L (ref 0–50)
ANION GAP SERPL CALCULATED.3IONS-SCNC: 9 MMOL/L (ref 7–15)
AST SERPL W P-5'-P-CCNC: 35 U/L (ref 0–45)
BILIRUB SERPL-MCNC: 0.3 MG/DL
BUN SERPL-MCNC: 10.2 MG/DL (ref 6–20)
CALCIUM SERPL-MCNC: 8.7 MG/DL (ref 8.8–10.4)
CHLORIDE SERPL-SCNC: 104 MMOL/L (ref 98–107)
CREAT SERPL-MCNC: 0.81 MG/DL (ref 0.51–0.95)
EGFRCR SERPLBLD CKD-EPI 2021: >90 ML/MIN/1.73M2
GLUCOSE SERPL-MCNC: 96 MG/DL (ref 70–99)
HCO3 SERPL-SCNC: 24 MMOL/L (ref 22–29)
POTASSIUM SERPL-SCNC: 4 MMOL/L (ref 3.4–5.3)
PROT SERPL-MCNC: 6.2 G/DL (ref 6.4–8.3)
SODIUM SERPL-SCNC: 137 MMOL/L (ref 135–145)
VIT B12 SERPL-MCNC: 451 PG/ML (ref 232–1245)

## 2025-08-18 ENCOUNTER — VIRTUAL VISIT (OUTPATIENT)
Dept: FAMILY MEDICINE | Facility: CLINIC | Age: 35
End: 2025-08-18
Payer: COMMERCIAL

## 2025-08-18 DIAGNOSIS — M54.50 CHRONIC RIGHT-SIDED LOW BACK PAIN, UNSPECIFIED WHETHER SCIATICA PRESENT: Primary | ICD-10-CM

## 2025-08-18 DIAGNOSIS — F19.11 SUBSTANCE ABUSE IN REMISSION (H): ICD-10-CM

## 2025-08-18 DIAGNOSIS — G89.29 CHRONIC RIGHT-SIDED LOW BACK PAIN, UNSPECIFIED WHETHER SCIATICA PRESENT: Primary | ICD-10-CM

## 2025-08-18 PROCEDURE — 98002 SYNCH AUDIO-VIDEO NEW MOD 45: CPT | Performed by: FAMILY MEDICINE

## 2025-08-18 RX ORDER — DEXTROAMPHETAMINE SACCHARATE, AMPHETAMINE ASPARTATE MONOHYDRATE, DEXTROAMPHETAMINE SULFATE AND AMPHETAMINE SULFATE 7.5; 7.5; 7.5; 7.5 MG/1; MG/1; MG/1; MG/1
CAPSULE, EXTENDED RELEASE ORAL
COMMUNITY

## 2025-08-18 RX ORDER — OLANZAPINE 5 MG/1
1 TABLET, FILM COATED ORAL DAILY
COMMUNITY
Start: 2025-06-19

## 2025-08-18 RX ORDER — BUPRENORPHINE AND NALOXONE 2; .5 MG/1; MG/1
0.5 FILM, SOLUBLE BUCCAL; SUBLINGUAL 2 TIMES DAILY PRN
Qty: 30 FILM | Refills: 0 | Status: SHIPPED | OUTPATIENT
Start: 2025-08-18

## 2025-08-18 RX ORDER — ESCITALOPRAM OXALATE 10 MG/1
10 TABLET ORAL DAILY
COMMUNITY

## 2025-08-18 ASSESSMENT — ANXIETY QUESTIONNAIRES
5. BEING SO RESTLESS THAT IT IS HARD TO SIT STILL: NOT AT ALL
3. WORRYING TOO MUCH ABOUT DIFFERENT THINGS: SEVERAL DAYS
6. BECOMING EASILY ANNOYED OR IRRITABLE: NOT AT ALL
1. FEELING NERVOUS, ANXIOUS, OR ON EDGE: MORE THAN HALF THE DAYS
2. NOT BEING ABLE TO STOP OR CONTROL WORRYING: SEVERAL DAYS
GAD7 TOTAL SCORE: 6
IF YOU CHECKED OFF ANY PROBLEMS ON THIS QUESTIONNAIRE, HOW DIFFICULT HAVE THESE PROBLEMS MADE IT FOR YOU TO DO YOUR WORK, TAKE CARE OF THINGS AT HOME, OR GET ALONG WITH OTHER PEOPLE: NOT DIFFICULT AT ALL
7. FEELING AFRAID AS IF SOMETHING AWFUL MIGHT HAPPEN: SEVERAL DAYS
GAD7 TOTAL SCORE: 6

## 2025-08-18 ASSESSMENT — PATIENT HEALTH QUESTIONNAIRE - PHQ9
5. POOR APPETITE OR OVEREATING: SEVERAL DAYS
SUM OF ALL RESPONSES TO PHQ QUESTIONS 1-9: 4
SUM OF ALL RESPONSES TO PHQ QUESTIONS 1-9: 4
10. IF YOU CHECKED OFF ANY PROBLEMS, HOW DIFFICULT HAVE THESE PROBLEMS MADE IT FOR YOU TO DO YOUR WORK, TAKE CARE OF THINGS AT HOME, OR GET ALONG WITH OTHER PEOPLE: NOT DIFFICULT AT ALL

## 2025-08-23 ENCOUNTER — NURSE TRIAGE (OUTPATIENT)
Dept: NURSING | Facility: CLINIC | Age: 35
End: 2025-08-23
Payer: COMMERCIAL

## 2025-08-23 DIAGNOSIS — R61 DIAPHORESIS: Primary | ICD-10-CM

## 2025-08-25 RX ORDER — CLONIDINE HYDROCHLORIDE 0.1 MG/1
.1-.2 TABLET ORAL EVERY 8 HOURS PRN
Qty: 60 TABLET | Refills: 5 | Status: SHIPPED | OUTPATIENT
Start: 2025-08-25

## (undated) DEVICE — SYSTEM LAPAROVUE VISIBILITY LAPVUE10

## (undated) DEVICE — NDL INSUFFLATION 13GA 120MM C2201

## (undated) DEVICE — ENDO TROCAR SLEEVE KII Z-THREADED 05X100MM CTS02

## (undated) DEVICE — DEVICE CLOSURE V-LOC 2-0 V-20 TAPER POINT 6IN VLOCN0605

## (undated) DEVICE — MARKER SURG SKIN STRL 77734

## (undated) DEVICE — PROTECTOR ARM STANDARD ONE STEP

## (undated) DEVICE — DRAPE MAYO STAND 29.5X55.5" 8339

## (undated) DEVICE — BRIEF STRETCH XL MPS40

## (undated) DEVICE — DAVINCI XI SEAL UNIVERSAL 5-8MM 470361

## (undated) DEVICE — CUSTOM PACK DA VINCI GYN SMA5BDVHEA

## (undated) DEVICE — TUBING LAP SUCT/IRRIG STRYKER 250070500

## (undated) DEVICE — NEEDLE HYPO 22X1-1/2 SAFETY 305900

## (undated) DEVICE — SU ETHILON 4-0 PS-2 18" BLACK 1667H

## (undated) DEVICE — GLOVE SURG PI ULTRA TOUCH M SZ 6-1/2 LF

## (undated) DEVICE — SUTURE GORTEX 2N02A

## (undated) DEVICE — DRAPE SHEET TABLE COVER KC 42301*

## (undated) DEVICE — MAT FLOOR SURGICAL 40X38 0702140238

## (undated) DEVICE — SUTURE VICRYL+ 0 27IN CT-1 UND VCP260H

## (undated) DEVICE — DAVINCI HOT SHEARS TIP COVER  400180

## (undated) DEVICE — CATH FOLEY 16FR 5ML LUBRICATH LATEX 0165L16

## (undated) DEVICE — APPLICATOR ENDOSCOPIC 5 SURGICEL POWDER 3123SPEA

## (undated) DEVICE — BANDAGE ADH LF 1X3 ABN3100A

## (undated) DEVICE — DAVINCI XI DRAPE ARM 470015

## (undated) DEVICE — TUBING FILTER TRI-LUMEN AIRSEAL ASC-EVAC1

## (undated) DEVICE — DRAPE U SPLIT 74X120" 29440

## (undated) DEVICE — BAG URINARY DRAIN 2000ML LF 154002

## (undated) DEVICE — PAD POS XL 1X20X40IN PINK PIGAZZI

## (undated) DEVICE — DAVINCI XI HANDPIECE ESU VESSEL SEALER 8MM EXT 480422

## (undated) DEVICE — BLADE KNIFE SURG 10 371110

## (undated) DEVICE — GLOVE SURG PI ULTRA TOUCH M SZ 7 LF 42670

## (undated) DEVICE — SUTURE VICRYL+ 0 36IN CT-1 UND VCP946H

## (undated) DEVICE — PLATE GROUNDING ADULT W/CORD 9165L

## (undated) DEVICE — SU WND CLOSURE V-LOC 90 SZ 2-0 9" GS-22 VLOCM2145

## (undated) DEVICE — PREP CHLORAPREP 26ML TINTED HI-LITE ORANGE 930815

## (undated) DEVICE — SUTURE VICRYL+ 3-0 18IN PS-2 UND VCP497H

## (undated) DEVICE — MORCELLATOR XCISE SINGLE USE

## (undated) DEVICE — BLADE KNIFE SURG 11 371111

## (undated) DEVICE — SUCTION MANIFOLD NEPTUNE 2 SYS 1 PORT 702-025-000

## (undated) DEVICE — DECANTER VIAL 2006S

## (undated) DEVICE — LUBRICANT INST ELECTROLUBE EL101

## (undated) DEVICE — PREP DYNA-HEX 4% CHG SCRUB 4OZ BOTTLE MDS098710

## (undated) DEVICE — GLOVE BIOGEL PI ULTRATOUCH G SZ 6.0 42160

## (undated) DEVICE — ENDO SHEARS RENEW LAP ENDOCUT SCISSOR TIP 16.5MM 3142

## (undated) DEVICE — ADAPTER DRAPE ALLY AU-AD

## (undated) DEVICE — SU WND CLOSURE VLOC 180 ABS 0 9" GS-21 VLOCL0346

## (undated) DEVICE — GOWN XXL 9575

## (undated) DEVICE — DAVINCI XI OBTURATOR BLADELESS 8MM 470359

## (undated) DEVICE — SURGICEL POWDER ABSORBABLE HEMOSTAT 3GM 3013SP

## (undated) DEVICE — SYR 10ML LL W/O NDL 302995

## (undated) DEVICE — GOWN IMPERVIOUS BREATHABLE SMART LG 89015

## (undated) DEVICE — GLOVE SURG PI ULTRA TOUCH M SZ 7-1/2 LF

## (undated) DEVICE — SYR 50ML SLIP TIP W/O NDL 309654

## (undated) DEVICE — DRAPE BACK TABLE  44X90" 8377

## (undated) DEVICE — Device

## (undated) DEVICE — DAVINCI XI DRAPE COLUMN 470341

## (undated) DEVICE — ENDO OBTURATOR ACCESS PORT BLADELESS 8X100MM IAS8-100LP

## (undated) DEVICE — SYR 50ML CATH TIP W/O NDL 309620

## (undated) DEVICE — SOL WATER IRRIG 1000ML BOTTLE 2F7114

## (undated) DEVICE — STRAP CATH LEG ADJUSTABLE 0814-8200

## (undated) DEVICE — SU VICRYL+ 0 8-18 CT1/CR UND VCP840D

## (undated) DEVICE — UTERINE MANIPULATOR RUMI 6.7MMX8CM UMB678

## (undated) DEVICE — SUTURE VICRYL+ 2-0 27IN SH UND VCP417H

## (undated) RX ORDER — BUPIVACAINE HYDROCHLORIDE 2.5 MG/ML
INJECTION, SOLUTION INFILTRATION; PERINEURAL
Status: DISPENSED
Start: 2023-02-22

## (undated) RX ORDER — DEXAMETHASONE SODIUM PHOSPHATE 10 MG/ML
INJECTION, SOLUTION INTRAMUSCULAR; INTRAVENOUS
Status: DISPENSED
Start: 2023-02-22

## (undated) RX ORDER — PROPOFOL 10 MG/ML
INJECTION, EMULSION INTRAVENOUS
Status: DISPENSED
Start: 2023-02-22

## (undated) RX ORDER — FENTANYL CITRATE 50 UG/ML
INJECTION, SOLUTION INTRAMUSCULAR; INTRAVENOUS
Status: DISPENSED
Start: 2023-03-01

## (undated) RX ORDER — LIDOCAINE HYDROCHLORIDE AND EPINEPHRINE 10; 10 MG/ML; UG/ML
INJECTION, SOLUTION INFILTRATION; PERINEURAL
Status: DISPENSED
Start: 2023-02-22

## (undated) RX ORDER — KETAMINE HYDROCHLORIDE 10 MG/ML
INJECTION INTRAMUSCULAR; INTRAVENOUS
Status: DISPENSED
Start: 2023-02-22

## (undated) RX ORDER — FENTANYL CITRATE 50 UG/ML
INJECTION, SOLUTION INTRAMUSCULAR; INTRAVENOUS
Status: DISPENSED
Start: 2023-02-22

## (undated) RX ORDER — BUPIVACAINE HYDROCHLORIDE 2.5 MG/ML
INJECTION, SOLUTION INFILTRATION; PERINEURAL
Status: DISPENSED
Start: 2023-03-02

## (undated) RX ORDER — ONDANSETRON 2 MG/ML
INJECTION INTRAMUSCULAR; INTRAVENOUS
Status: DISPENSED
Start: 2023-02-22

## (undated) RX ORDER — FENTANYL CITRATE 50 UG/ML
INJECTION, SOLUTION INTRAMUSCULAR; INTRAVENOUS
Status: DISPENSED
Start: 2023-03-02

## (undated) RX ORDER — KETOROLAC TROMETHAMINE 30 MG/ML
INJECTION, SOLUTION INTRAMUSCULAR; INTRAVENOUS
Status: DISPENSED
Start: 2023-03-02

## (undated) RX ORDER — PROPOFOL 10 MG/ML
INJECTION, EMULSION INTRAVENOUS
Status: DISPENSED
Start: 2023-03-02

## (undated) RX ORDER — FENTANYL CITRATE-0.9 % NACL/PF 10 MCG/ML
PLASTIC BAG, INJECTION (ML) INTRAVENOUS
Status: DISPENSED
Start: 2023-02-22